# Patient Record
Sex: FEMALE | Race: WHITE | HISPANIC OR LATINO
[De-identification: names, ages, dates, MRNs, and addresses within clinical notes are randomized per-mention and may not be internally consistent; named-entity substitution may affect disease eponyms.]

---

## 2017-02-15 ENCOUNTER — APPOINTMENT (OUTPATIENT)
Dept: OTHER | Facility: CLINIC | Age: 57
End: 2017-02-15

## 2017-10-04 ENCOUNTER — INPATIENT (INPATIENT)
Facility: HOSPITAL | Age: 57
LOS: 3 days | Discharge: HOME | DRG: 812 | End: 2017-10-08
Attending: INTERNAL MEDICINE | Admitting: INTERNAL MEDICINE
Payer: COMMERCIAL

## 2017-10-04 VITALS
TEMPERATURE: 98 F | SYSTOLIC BLOOD PRESSURE: 127 MMHG | OXYGEN SATURATION: 100 % | HEIGHT: 62 IN | RESPIRATION RATE: 16 BRPM | DIASTOLIC BLOOD PRESSURE: 78 MMHG | HEART RATE: 87 BPM | WEIGHT: 134.92 LBS

## 2017-10-04 DIAGNOSIS — Z98.89 OTHER SPECIFIED POSTPROCEDURAL STATES: Chronic | ICD-10-CM

## 2017-10-04 LAB
ANION GAP SERPL CALC-SCNC: 5 MMOL/L — SIGNIFICANT CHANGE UP (ref 5–17)
APPEARANCE UR: CLEAR — SIGNIFICANT CHANGE UP
BASOPHILS # BLD AUTO: 0 K/UL — SIGNIFICANT CHANGE UP (ref 0–0.2)
BASOPHILS NFR BLD AUTO: 0.7 % — SIGNIFICANT CHANGE UP (ref 0–2)
BILIRUB UR-MCNC: NEGATIVE — SIGNIFICANT CHANGE UP
BUN SERPL-MCNC: 17 MG/DL — SIGNIFICANT CHANGE UP (ref 7–18)
CALCIUM SERPL-MCNC: 8.4 MG/DL — SIGNIFICANT CHANGE UP (ref 8.4–10.5)
CHLORIDE SERPL-SCNC: 111 MMOL/L — HIGH (ref 96–108)
CO2 SERPL-SCNC: 26 MMOL/L — SIGNIFICANT CHANGE UP (ref 22–31)
COLOR SPEC: YELLOW — SIGNIFICANT CHANGE UP
CREAT SERPL-MCNC: 0.82 MG/DL — SIGNIFICANT CHANGE UP (ref 0.5–1.3)
DIFF PNL FLD: NEGATIVE — SIGNIFICANT CHANGE UP
EOSINOPHIL # BLD AUTO: 0.3 K/UL — SIGNIFICANT CHANGE UP (ref 0–0.5)
EOSINOPHIL NFR BLD AUTO: 4.8 % — SIGNIFICANT CHANGE UP (ref 0–6)
GLUCOSE SERPL-MCNC: 85 MG/DL — SIGNIFICANT CHANGE UP (ref 70–99)
GLUCOSE UR QL: NEGATIVE — SIGNIFICANT CHANGE UP
HCT VFR BLD CALC: 27.1 % — LOW (ref 34.5–45)
HGB BLD-MCNC: 8.3 G/DL — LOW (ref 11.5–15.5)
KETONES UR-MCNC: NEGATIVE — SIGNIFICANT CHANGE UP
LEUKOCYTE ESTERASE UR-ACNC: ABNORMAL
LYMPHOCYTES # BLD AUTO: 1.8 K/UL — SIGNIFICANT CHANGE UP (ref 1–3.3)
LYMPHOCYTES # BLD AUTO: 31.9 % — SIGNIFICANT CHANGE UP (ref 13–44)
MAGNESIUM SERPL-MCNC: 2.4 MG/DL — SIGNIFICANT CHANGE UP (ref 1.6–2.6)
MCHC RBC-ENTMCNC: 25 PG — LOW (ref 27–34)
MCHC RBC-ENTMCNC: 30.5 GM/DL — LOW (ref 32–36)
MCV RBC AUTO: 82 FL — SIGNIFICANT CHANGE UP (ref 80–100)
MONOCYTES # BLD AUTO: 0.7 K/UL — SIGNIFICANT CHANGE UP (ref 0–0.9)
MONOCYTES NFR BLD AUTO: 12.3 % — SIGNIFICANT CHANGE UP (ref 2–14)
NEUTROPHILS # BLD AUTO: 2.9 K/UL — SIGNIFICANT CHANGE UP (ref 1.8–7.4)
NEUTROPHILS NFR BLD AUTO: 50.3 % — SIGNIFICANT CHANGE UP (ref 43–77)
NITRITE UR-MCNC: NEGATIVE — SIGNIFICANT CHANGE UP
OB PNL STL: NEGATIVE — SIGNIFICANT CHANGE UP
PH UR: 5 — SIGNIFICANT CHANGE UP (ref 5–8)
PHOSPHATE SERPL-MCNC: 3.3 MG/DL — SIGNIFICANT CHANGE UP (ref 2.5–4.5)
PLATELET # BLD AUTO: 195 K/UL — SIGNIFICANT CHANGE UP (ref 150–400)
POTASSIUM SERPL-MCNC: 4.4 MMOL/L — SIGNIFICANT CHANGE UP (ref 3.5–5.3)
POTASSIUM SERPL-SCNC: 4.4 MMOL/L — SIGNIFICANT CHANGE UP (ref 3.5–5.3)
PROT UR-MCNC: NEGATIVE — SIGNIFICANT CHANGE UP
RBC # BLD: 3.3 M/UL — LOW (ref 3.8–5.2)
RBC # FLD: 14.9 % — HIGH (ref 10.3–14.5)
SODIUM SERPL-SCNC: 142 MMOL/L — SIGNIFICANT CHANGE UP (ref 135–145)
SP GR SPEC: 1.01 — SIGNIFICANT CHANGE UP (ref 1.01–1.02)
UROBILINOGEN FLD QL: NEGATIVE — SIGNIFICANT CHANGE UP
WBC # BLD: 5.7 K/UL — SIGNIFICANT CHANGE UP (ref 3.8–10.5)
WBC # FLD AUTO: 5.7 K/UL — SIGNIFICANT CHANGE UP (ref 3.8–10.5)

## 2017-10-04 PROCEDURE — 99285 EMERGENCY DEPT VISIT HI MDM: CPT

## 2017-10-04 PROCEDURE — 70450 CT HEAD/BRAIN W/O DYE: CPT | Mod: 26

## 2017-10-04 PROCEDURE — 71010: CPT | Mod: 26

## 2017-10-04 RX ORDER — SODIUM CHLORIDE 9 MG/ML
1000 INJECTION INTRAMUSCULAR; INTRAVENOUS; SUBCUTANEOUS ONCE
Qty: 0 | Refills: 0 | Status: COMPLETED | OUTPATIENT
Start: 2017-10-04 | End: 2017-10-04

## 2017-10-04 RX ORDER — SODIUM CHLORIDE 9 MG/ML
1000 INJECTION INTRAMUSCULAR; INTRAVENOUS; SUBCUTANEOUS
Qty: 0 | Refills: 0 | Status: DISCONTINUED | OUTPATIENT
Start: 2017-10-04 | End: 2017-10-05

## 2017-10-04 RX ADMIN — SODIUM CHLORIDE 3000 MILLILITER(S): 9 INJECTION INTRAMUSCULAR; INTRAVENOUS; SUBCUTANEOUS at 21:10

## 2017-10-04 RX ADMIN — SODIUM CHLORIDE 150 MILLILITER(S): 9 INJECTION INTRAMUSCULAR; INTRAVENOUS; SUBCUTANEOUS at 22:59

## 2017-10-04 NOTE — ED PROVIDER NOTE - MEDICAL DECISION MAKING DETAILS
Pt with symptomatic anemia. MAR and DR. Hoover endorsed. Pt agrees with admission for hematology consult and serial H/H. I had a detailed discussion with the patient and/or guardian regarding the historical points, exam findings, and any diagnostic results supporting the admit diagnosis.

## 2017-10-04 NOTE — ED PROVIDER NOTE - OBJECTIVE STATEMENT
56 y/o F pt w/ PMHx of Anemia presents to ED c/o weakness and dizziness described as vertiginous since yesterday associated w/ intermittent blurry vision. Pt denies LOC, chest pain, SOB, BRBPR, vaginal bleeding, or any other complaints. Pt reports that she was informed recently by her PMD Dr. Nico De Los Santos that she is anemic. NKDA.

## 2017-10-05 DIAGNOSIS — Z29.9 ENCOUNTER FOR PROPHYLACTIC MEASURES, UNSPECIFIED: ICD-10-CM

## 2017-10-05 DIAGNOSIS — M25.552 PAIN IN LEFT HIP: ICD-10-CM

## 2017-10-05 DIAGNOSIS — D64.9 ANEMIA, UNSPECIFIED: ICD-10-CM

## 2017-10-05 DIAGNOSIS — R10.32 LEFT LOWER QUADRANT PAIN: ICD-10-CM

## 2017-10-05 DIAGNOSIS — D64.89 OTHER SPECIFIED ANEMIAS: ICD-10-CM

## 2017-10-05 LAB
ALBUMIN SERPL ELPH-MCNC: 3.1 G/DL — LOW (ref 3.5–5)
ALBUMIN SERPL ELPH-MCNC: 3.4 G/DL — LOW (ref 3.5–5)
ALP SERPL-CCNC: 70 U/L — SIGNIFICANT CHANGE UP (ref 40–120)
ALP SERPL-CCNC: 76 U/L — SIGNIFICANT CHANGE UP (ref 40–120)
ALT FLD-CCNC: 22 U/L DA — SIGNIFICANT CHANGE UP (ref 10–60)
ALT FLD-CCNC: 24 U/L DA — SIGNIFICANT CHANGE UP (ref 10–60)
ANION GAP SERPL CALC-SCNC: 4 MMOL/L — LOW (ref 5–17)
AST SERPL-CCNC: 23 U/L — SIGNIFICANT CHANGE UP (ref 10–40)
AST SERPL-CCNC: 23 U/L — SIGNIFICANT CHANGE UP (ref 10–40)
B2 MICROGLOB SERPL-MCNC: 2.4 MG/L — HIGH (ref 0.8–2.2)
BASOPHILS # BLD AUTO: 0.1 K/UL — SIGNIFICANT CHANGE UP (ref 0–0.2)
BASOPHILS NFR BLD AUTO: 1.1 % — SIGNIFICANT CHANGE UP (ref 0–2)
BILIRUB DIRECT SERPL-MCNC: 0.1 MG/DL — SIGNIFICANT CHANGE UP (ref 0–0.2)
BILIRUB DIRECT SERPL-MCNC: 0.1 MG/DL — SIGNIFICANT CHANGE UP (ref 0–0.2)
BILIRUB INDIRECT FLD-MCNC: 0.2 MG/DL — SIGNIFICANT CHANGE UP (ref 0.2–1)
BILIRUB INDIRECT FLD-MCNC: 0.3 MG/DL — SIGNIFICANT CHANGE UP (ref 0.2–1)
BILIRUB SERPL-MCNC: 0.3 MG/DL — SIGNIFICANT CHANGE UP (ref 0.2–1.2)
BILIRUB SERPL-MCNC: 0.4 MG/DL — SIGNIFICANT CHANGE UP (ref 0.2–1.2)
BUN SERPL-MCNC: 15 MG/DL — SIGNIFICANT CHANGE UP (ref 7–18)
CALCIUM SERPL-MCNC: 7.8 MG/DL — LOW (ref 8.4–10.5)
CHLORIDE SERPL-SCNC: 114 MMOL/L — HIGH (ref 96–108)
CO2 SERPL-SCNC: 26 MMOL/L — SIGNIFICANT CHANGE UP (ref 22–31)
CREAT SERPL-MCNC: 0.73 MG/DL — SIGNIFICANT CHANGE UP (ref 0.5–1.3)
CRP SERPL-MCNC: 0.2 MG/DL — SIGNIFICANT CHANGE UP (ref 0–0.4)
EOSINOPHIL # BLD AUTO: 0.2 K/UL — SIGNIFICANT CHANGE UP (ref 0–0.5)
EOSINOPHIL NFR BLD AUTO: 4.2 % — SIGNIFICANT CHANGE UP (ref 0–6)
ERYTHROCYTE [SEDIMENTATION RATE] IN BLOOD: 18 MM/HR — SIGNIFICANT CHANGE UP (ref 0–20)
GLUCOSE SERPL-MCNC: 88 MG/DL — SIGNIFICANT CHANGE UP (ref 70–99)
HCT VFR BLD CALC: 27.4 % — LOW (ref 34.5–45)
HGB BLD-MCNC: 8.3 G/DL — LOW (ref 11.5–15.5)
IGA FLD-MCNC: 287 MG/DL — SIGNIFICANT CHANGE UP (ref 68–378)
IGG FLD-MCNC: 918 MG/DL — SIGNIFICANT CHANGE UP (ref 694–1618)
IGM SERPL-MCNC: 74 MG/DL — SIGNIFICANT CHANGE UP (ref 40–230)
IRON SATN MFR SERPL: 25 UG/DL — LOW (ref 40–150)
IRON SATN MFR SERPL: 6 % — LOW (ref 15–50)
KAPPA LC SER QL IFE: 2.19 MG/DL — HIGH (ref 0.33–1.94)
KAPPA LC SER QL IFE: 2.19 MG/DL — HIGH (ref 0.33–1.94)
KAPPA/LAMBDA FREE LIGHT CHAIN RATIO, SERUM: 0.83 RATIO — SIGNIFICANT CHANGE UP (ref 0.26–1.65)
KAPPA/LAMBDA FREE LIGHT CHAIN RATIO, SERUM: 0.83 RATIO — SIGNIFICANT CHANGE UP (ref 0.26–1.65)
LAMBDA LC SER QL IFE: 2.64 MG/DL — HIGH (ref 0.57–2.63)
LAMBDA LC SER QL IFE: 2.64 MG/DL — HIGH (ref 0.57–2.63)
LDH SERPL L TO P-CCNC: 124 U/L — SIGNIFICANT CHANGE UP (ref 120–225)
LYMPHOCYTES # BLD AUTO: 1.7 K/UL — SIGNIFICANT CHANGE UP (ref 1–3.3)
LYMPHOCYTES # BLD AUTO: 30.7 % — SIGNIFICANT CHANGE UP (ref 13–44)
MCHC RBC-ENTMCNC: 25 PG — LOW (ref 27–34)
MCHC RBC-ENTMCNC: 30.3 GM/DL — LOW (ref 32–36)
MCV RBC AUTO: 82.5 FL — SIGNIFICANT CHANGE UP (ref 80–100)
MONOCYTES # BLD AUTO: 0.7 K/UL — SIGNIFICANT CHANGE UP (ref 0–0.9)
MONOCYTES NFR BLD AUTO: 11.6 % — SIGNIFICANT CHANGE UP (ref 2–14)
NEUTROPHILS # BLD AUTO: 3 K/UL — SIGNIFICANT CHANGE UP (ref 1.8–7.4)
NEUTROPHILS NFR BLD AUTO: 52.4 % — SIGNIFICANT CHANGE UP (ref 43–77)
NT-PROBNP SERPL-SCNC: 475 PG/ML — HIGH (ref 0–125)
PLATELET # BLD AUTO: 192 K/UL — SIGNIFICANT CHANGE UP (ref 150–400)
POTASSIUM SERPL-MCNC: 4.4 MMOL/L — SIGNIFICANT CHANGE UP (ref 3.5–5.3)
POTASSIUM SERPL-SCNC: 4.4 MMOL/L — SIGNIFICANT CHANGE UP (ref 3.5–5.3)
PROT SERPL-MCNC: 6.1 G/DL — SIGNIFICANT CHANGE UP (ref 6–8.3)
PROT SERPL-MCNC: 6.7 G/DL — SIGNIFICANT CHANGE UP (ref 6–8.3)
RBC # BLD: 3.32 M/UL — LOW (ref 3.8–5.2)
RBC # BLD: 3.65 M/UL — LOW (ref 3.8–5.2)
RBC # FLD: 15.4 % — HIGH (ref 10.3–14.5)
RETICS #: 40.5 K/UL — SIGNIFICANT CHANGE UP (ref 25–125)
RETICS/RBC NFR: 1.1 % — SIGNIFICANT CHANGE UP (ref 0.5–2.5)
RHEUMATOID FACT SERPL-ACNC: 12 IU/ML — SIGNIFICANT CHANGE UP (ref 0–13.9)
SODIUM SERPL-SCNC: 144 MMOL/L — SIGNIFICANT CHANGE UP (ref 135–145)
TIBC SERPL-MCNC: 436 UG/DL — SIGNIFICANT CHANGE UP (ref 250–450)
TSH SERPL-MCNC: 2.1 UU/ML — SIGNIFICANT CHANGE UP (ref 0.34–4.82)
UIBC SERPL-MCNC: 411 UG/DL — HIGH (ref 110–370)
WBC # BLD: 5.7 K/UL — SIGNIFICANT CHANGE UP (ref 3.8–10.5)
WBC # FLD AUTO: 5.7 K/UL — SIGNIFICANT CHANGE UP (ref 3.8–10.5)

## 2017-10-05 PROCEDURE — 76700 US EXAM ABDOM COMPLETE: CPT | Mod: 26

## 2017-10-05 PROCEDURE — 83020 HEMOGLOBIN ELECTROPHORESIS: CPT | Mod: 26

## 2017-10-05 PROCEDURE — 73502 X-RAY EXAM HIP UNI 2-3 VIEWS: CPT | Mod: 26,LT

## 2017-10-05 RX ORDER — PANTOPRAZOLE SODIUM 20 MG/1
40 TABLET, DELAYED RELEASE ORAL
Qty: 0 | Refills: 0 | Status: DISCONTINUED | OUTPATIENT
Start: 2017-10-05 | End: 2017-10-08

## 2017-10-05 RX ORDER — SODIUM CHLORIDE 9 MG/ML
1000 INJECTION INTRAMUSCULAR; INTRAVENOUS; SUBCUTANEOUS
Qty: 0 | Refills: 0 | Status: DISCONTINUED | OUTPATIENT
Start: 2017-10-05 | End: 2017-10-06

## 2017-10-05 RX ADMIN — SODIUM CHLORIDE 150 MILLILITER(S): 9 INJECTION INTRAMUSCULAR; INTRAVENOUS; SUBCUTANEOUS at 06:19

## 2017-10-05 RX ADMIN — SODIUM CHLORIDE 100 MILLILITER(S): 9 INJECTION INTRAMUSCULAR; INTRAVENOUS; SUBCUTANEOUS at 20:19

## 2017-10-05 NOTE — H&P ADULT - NSHPLABSRESULTS_GEN_ALL_CORE
8.3    5.7   )-----------( 192      ( 05 Oct 2017 05:27 )             27.4   10-05    144  |  114<H>  |  15  ----------------------------<  88  4.4   |  26  |  0.73    Ca    7.8<L>      05 Oct 2017 05:27  Phos  3.3     10-04  Mg     2.4     10-04

## 2017-10-05 NOTE — H&P ADULT - PROBLEM SELECTOR PLAN 1
- FOBT neg, No h/o melena, hematochezia or hematemesis  - f/u iron panel  - f/u retic count  - f/u peripheral smear  - f/u haptoglobin, LDH  - f/u Hb electropheresis  - Hematology consullt  - Hb stable for now. Likely not an acute blood loss.   - Transfuse for Hb< 7 or Hb<8 if patient continues to remain symptomatic

## 2017-10-05 NOTE — CONSULT NOTE ADULT - ASSESSMENT
58 y/o female with chronic anemia not previously evaluated and taking oral iron w/o improvement. She present with symptomatic anemia associated with periorbital swelling / blurry vision / right wrist swelling anorexia w/ 3-5lb weight loss, left sided abdominal and left hip pain.

## 2017-10-05 NOTE — H&P ADULT - NSHPPHYSICALEXAM_GEN_ALL_CORE
Vital Signs Last 24 Hrs  T(C): 37 (05 Oct 2017 05:39), Max: 37.2 (04 Oct 2017 20:14)  T(F): 98.6 (05 Oct 2017 05:39), Max: 99 (04 Oct 2017 20:14)  HR: 58 (05 Oct 2017 05:39) (58 - 88)  BP: 114/56 (05 Oct 2017 05:39) (91/54 - 127/78)  BP(mean): --  RR: 16 (05 Oct 2017 05:39) (16 - 16)  SpO2: 100% (05 Oct 2017 05:39) (100% - 100%)    GENERAL: NAD  HEAD:  Atraumatic, Normocephalic  EYES: EOMI, PERRLA  ENMT:Moist mucous membranes  NECK: Supple  NERVOUS SYSTEM:  Alert & Oriented X3. No vertebral tenderness. Strength 5/5 on all extremities.  CHEST/LUNG: Clear to auscultation bilaterally; No rales, rhonchi, wheezing, or rubs  HEART: Regular rate and rhythm; No murmurs, rubs, or gallops  ABDOMEN: Soft, Nontender, Nondistended; Bowel sounds present  EXTREMITIES:  2+ Peripheral Pulses, No clubbing, cyanosis, or edema.

## 2017-10-05 NOTE — H&P ADULT - ASSESSMENT
56yo F with h/o anemia came in with c/o lightheadedness since the past 3 days. She said she feels very tired all the time. Denies vertigo, falls, LOC, chest pain or SOB. She also c/o weakness since the past couple of days. She said she has severe L hip pain on the lateral side of her L hip going down to the mid thigh since 1 week. It is intermittent 10/10 and stabbing. She said she has intermittent blurry vision since 2-3 days. Denies vaginal bleeding. Denies hematemesis, hematochezia or melena. She said she went through her menopause in 2007. No other complaints at this time.

## 2017-10-05 NOTE — CONSULT NOTE ADULT - PROBLEM SELECTOR RECOMMENDATION 3
Normocytic normochromic - chronic - etiology is unclear at this time but signs and symptoms suggest a plasma cell dyscrasia / amyloid.   Evaluation for anemia sent.   Recommend NT - pro BNP level as well.  Will follow

## 2017-10-05 NOTE — H&P ADULT - PROBLEM SELECTOR PLAN 2
Unsure what the cause is. Denies any fall or trauma. Strength 5/5. No weakness in any extremity. No vertebral tenderness. Full ROM.   - Could be neuropathic pain vs osteonecrosis  - Will do XR of the hip to rule  - f/u TSH, B12

## 2017-10-05 NOTE — CONSULT NOTE ADULT - SUBJECTIVE AND OBJECTIVE BOX
Patient is a 57y old  Female who presents with a chief complaint of Left inguinal pain and dizziness(05 Oct 2017 07:39)      HPI:  58yo F with h/o anemia for several years and told to take oral iron. She was recently referred by her PCP (Dr. Nico De Los Santos) for hematologic evaluation but the MD did not take her insurance. On questioning she notes progressive fatigue associated with periorbital swelling and swelling of the right forearm / wrist / hand.  She c/o new onset left sided abdominal pain. She was referred for u/s by her PCP for Oct 13 but  came in with c/o lightheadedness since the past 3 days. She said she feels very tired all the time. Denies vertigo, falls, LOC, chest pain or SOB. She also c/o weakness since the past couple of days. She said she has severe L hip pain on the lateral side of her L hip going down to the mid thigh since 1 week. It is intermittent 10/10 and stabbing. She said she has intermittent blurry vision since 2-3 days. Denies vaginal bleeding. Denies hematemesis, hematochezia or melena. She said she went through her menopause in . She notes strong smelling dark urine but no hematuria or foaming.  Patient denies family history of anemia or thalassemia. 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 Now on complete disability.  Colonoscopy several years ago      SOCIAL HISTORY:    FAMILY HISTORY:  Mother : Pancreatic / Liver cancer age 70  Maternal Aunt:  stomach cancer age 67  1st cousin : stomach cancer age 56.      MEDICATIONS  (STANDING):  pantoprazole    Tablet 40 milliGRAM(s) Oral before breakfast  sodium chloride 0.9%. 1000 milliLiter(s) (100 mL/Hr) IV Continuous <Continuous>    MEDICATIONS  (PRN):      Allergies    No Known Allergies    Intolerances        Vital Signs Last 24 Hrs  T(C): 36.8 (05 Oct 2017 08:31), Max: 37.2 (04 Oct 2017 20:14)  T(F): 98.2 (05 Oct 2017 08:31), Max: 99 (04 Oct 2017 20:14)  HR: 67 (05 Oct 2017 08:31) (58 - 88)  BP: 98/50 (05 Oct 2017 08:31) (91/54 - 127/78)  BP(mean): --  RR: 18 (05 Oct 2017 08:31) (16 - 18)  SpO2: 97% (05 Oct 2017 08:31) (97% - 100%)    PHYSICAL EXAM  General: adult in NAD  HEENT: clear oropharynx, anicteric sclera, pink conjunctiva  Neck: supple  CV: normal S1/S2 with no murmur rubs or gallops  Lungs: positive air movement b/l ant lungs,clear to auscultation, no wheezes, no rales  Abdomen: soft non-tender non-distended, no hepatosplenomegaly  Ext: no clubbing cyanosis or edema  Skin: no rashes and no petechiae  Neuro: alert and oriented X 4, no focal deficits      LABS:                          8.3    5.7   )-----------( 192      ( 05 Oct 2017 05:27 )             27.4         Mean Cell Volume : 82.5 fl  Mean Cell Hemoglobin : 25.0 pg  Mean Cell Hemoglobin Concentration : 30.3 gm/dL  Auto Neutrophil # : 3.0 K/uL  Auto Lymphocyte # : 1.7 K/uL  Auto Monocyte # : 0.7 K/uL  Auto Eosinophil # : 0.2 K/uL  Auto Basophil # : 0.1 K/uL  Auto Neutrophil % : 52.4 %  Auto Lymphocyte % : 30.7 %  Auto Monocyte % : 11.6 %  Auto Eosinophil % : 4.2 %  Auto Basophil % : 1.1 %      Serial CBC's  10-05 @ 05:27  Hct-27.4 / Hgb-8.3 / Plat-192 / RBC-3.32 / WBC-5.7  Serial CBC's  10-04 @ 21:11  Hct-27.1 / Hgb-8.3 / Plat-195 / RBC-3.30 / WBC-5.7      10-05    144  |  114<H>  |  15  ----------------------------<  88  4.4   |  26  |  0.73    Ca    7.8<L>      05 Oct 2017 05:27  Phos  3.3     10-04  Mg     2.4     10-04                        BLOOD SMEAR INTERPRETATION:       RADIOLOGY & ADDITIONAL STUDIES:

## 2017-10-05 NOTE — H&P ADULT - NSHPREVIEWOFSYSTEMS_GEN_ALL_CORE
REVIEW OF SYSTEMS:  CONSTITUTIONAL: Fatigue, No fever, or weight loss  EYES: blurry vision. No eye pain,  or discharge  RESPIRATORY: No cough, wheezing, chills or hemoptysis; No shortness of breath  CARDIOVASCULAR: No chest pain, palpitations, dizziness, or leg swelling  GASTROINTESTINAL: No abdominal or epigastric pain. No nausea, vomiting, or hematemesis; No diarrhea or constipation. No melena or hematochezia.  GENITOURINARY: No dysuria, frequency, hematuria, or incontinence  NEUROLOGICAL: No headaches, memory loss, loss of strength, or tremors  MUSCULOSKELETAL: Severe L hip pain going down to mid thigh. No muscle, back, or extremity pain

## 2017-10-05 NOTE — H&P ADULT - ATTENDING COMMENTS
58yo F with h/o anemia came in with c/o lightheadedness since the past 3 days. She said she feels very tired all the time. Denies vertigo, falls, LOC, chest pain or SOB. She also c/o weakness since the past couple of days. She said she has severe L hip pain on the lateral side of her L hip going down to the mid thigh since 1 week. It is intermittent 10/10 and stabbing. She said she has intermittent blurry vision since 2-3 days. Denies vaginal bleeding. Denies hematemesis, hematochezia or melena. She said she went through her menopause in 2007. No other complaints at this time. Patient denies family history of anemia or thallesemia.    pt seen in bed, vitals stable except for low bp 98/50, physical exam reveals lungs cta b/l, heart s1s2, abd soft nd nt bs+, ext no edema. labs and diagnostic test result reviewed.    assessment  --  symptomatic anemia, abd pain, left hip pain.    plan  --  admit to med, cont preadmit home meds, gi and dvt profilaxis, transfuse 1 unit prbc  cbc, bmp, mg, phos, lipids, tsh, bld cx, ua, ucx, anemia panel, fecal occult blood test, cea    ct abd pelv  xray left hip/pelv    gi cons  heme onc cons 56yo F with h/o anemia came in with c/o lightheadedness since the past 3 days. She said she feels very tired all the time. Denies vertigo, falls, LOC, chest pain or SOB. She also c/o weakness since the past couple of days. She said she has severe L hip pain on the lateral side of her L hip going down to the mid thigh since 1 week. It is intermittent 10/10 and stabbing. She said she has intermittent blurry vision since 2-3 days. Denies vaginal bleeding. Denies hematemesis, hematochezia or melena. She said she went through her menopause in 2007. No other complaints at this time. Patient denies family history of anemia or thallesemia.    pt seen in bed, vitals stable except for low bp 98/50, physical exam reveals lungs cta b/l, heart s1s2, abd soft nd nt bs+, ext no edema. labs and diagnostic test result reviewed.    assessment  --  symptomatic anemia, abd pain, left hip pain h/o anemia    plan  --  admit to med, cont preadmit home meds, gi and dvt profilaxis, transfuse 1 unit prbc  cbc, bmp, mg, phos, lipids, tsh, bld cx, ua, ucx, anemia panel, fecal occult blood test, cea    ct abd pelv  xray left hip/pelv    gi cons  heme onc cons

## 2017-10-05 NOTE — CHART NOTE - NSCHARTNOTEFT_GEN_A_CORE
Pt remains symptomatic despite Hgb >8.0 this AM  Pt will be transfused 1 unit PRBC per Dr. Frazier.  Pt has signed consent  will f/u CBC and check for clinical improvement of pt.     also sending vasculitic workup for cause of symptomatic anemia  will obtain another FOBT    Melissa RENE

## 2017-10-05 NOTE — H&P ADULT - HISTORY OF PRESENT ILLNESS
58yo F with h/o anemia came in with c/o lightheadedness since the past 3 days. She said she feels very tired all the time. Denies vertigo, falls, LOC, chest pain or SOB. She also c/o weakness since the past couple of days. She said she has severe L hip pain on the lateral side of her L hip going down to the mid thigh since 1 week. It is intermittent 10/10 and stabbing. She said she has intermittent blurry vision since 2-3 days. Denies vaginal bleeding. Denies hematemesis, hematochezia or melena. She said she went through her menopause in 2007. No other complaints at this time. Patient denies family history of anemia or thallesemia.

## 2017-10-06 LAB
% ALBUMIN: 56.4 % — SIGNIFICANT CHANGE UP
% ALPHA 1: 4.4 % — SIGNIFICANT CHANGE UP
% ALPHA 2: 10.6 % — SIGNIFICANT CHANGE UP
% BETA: 13.6 % — SIGNIFICANT CHANGE UP
% GAMMA: 15 % — SIGNIFICANT CHANGE UP
ALBUMIN SERPL ELPH-MCNC: 3.7 G/DL — SIGNIFICANT CHANGE UP (ref 3.6–5.5)
ALBUMIN/GLOB SERPL ELPH: 1.3 RATIO — SIGNIFICANT CHANGE UP
ALPHA1 GLOB SERPL ELPH-MCNC: 0.3 G/DL — SIGNIFICANT CHANGE UP (ref 0.1–0.4)
ALPHA2 GLOB SERPL ELPH-MCNC: 0.7 G/DL — SIGNIFICANT CHANGE UP (ref 0.5–1)
ANA PAT FLD IF-IMP: ABNORMAL
ANA TITR SER: ABNORMAL
B-GLOBULIN SERPL ELPH-MCNC: 0.9 G/DL — SIGNIFICANT CHANGE UP (ref 0.5–1)
FERRITIN SERPL-MCNC: 6 NG/ML — LOW (ref 15–150)
FOLATE SERPL-MCNC: >20 NG/ML — SIGNIFICANT CHANGE UP (ref 4.8–24.2)
GAMMA GLOBULIN: 1 G/DL — SIGNIFICANT CHANGE UP (ref 0.6–1.6)
HCT VFR BLD CALC: 31.1 % — LOW (ref 34.5–45)
HGB BLD-MCNC: 9.9 G/DL — LOW (ref 11.5–15.5)
INTERPRETATION SERPL IFE-IMP: SIGNIFICANT CHANGE UP
INTERPRETATION SERPL IFE-IMP: SIGNIFICANT CHANGE UP
MCHC RBC-ENTMCNC: 26.6 PG — LOW (ref 27–34)
MCHC RBC-ENTMCNC: 31.7 GM/DL — LOW (ref 32–36)
MCV RBC AUTO: 83.8 FL — SIGNIFICANT CHANGE UP (ref 80–100)
PLATELET # BLD AUTO: 179 K/UL — SIGNIFICANT CHANGE UP (ref 150–400)
PROT PATTERN SERPL ELPH-IMP: SIGNIFICANT CHANGE UP
PROT SERPL-MCNC: 6.5 G/DL — SIGNIFICANT CHANGE UP (ref 6–8.3)
PROT SERPL-MCNC: 6.5 G/DL — SIGNIFICANT CHANGE UP (ref 6–8.3)
RBC # BLD: 3.72 M/UL — LOW (ref 3.8–5.2)
RBC # FLD: 15.4 % — HIGH (ref 10.3–14.5)
VIT B12 SERPL-MCNC: >2000 PG/ML — HIGH (ref 243–894)
WBC # BLD: 4.9 K/UL — SIGNIFICANT CHANGE UP (ref 3.8–10.5)
WBC # FLD AUTO: 4.9 K/UL — SIGNIFICANT CHANGE UP (ref 3.8–10.5)

## 2017-10-06 PROCEDURE — 74176 CT ABD & PELVIS W/O CONTRAST: CPT | Mod: 26

## 2017-10-06 RX ORDER — SODIUM CHLORIDE 9 MG/ML
1000 INJECTION INTRAMUSCULAR; INTRAVENOUS; SUBCUTANEOUS
Qty: 0 | Refills: 0 | Status: DISCONTINUED | OUTPATIENT
Start: 2017-10-06 | End: 2017-10-08

## 2017-10-06 RX ORDER — SODIUM CHLORIDE 9 MG/ML
1000 INJECTION INTRAMUSCULAR; INTRAVENOUS; SUBCUTANEOUS ONCE
Qty: 0 | Refills: 0 | Status: COMPLETED | OUTPATIENT
Start: 2017-10-06 | End: 2017-10-06

## 2017-10-06 RX ADMIN — SODIUM CHLORIDE 1000 MILLILITER(S): 9 INJECTION INTRAMUSCULAR; INTRAVENOUS; SUBCUTANEOUS at 00:38

## 2017-10-06 RX ADMIN — PANTOPRAZOLE SODIUM 40 MILLIGRAM(S): 20 TABLET, DELAYED RELEASE ORAL at 06:11

## 2017-10-06 NOTE — PROGRESS NOTE ADULT - PROBLEM SELECTOR PLAN 2
Unsure what the cause is. Denies any fall or trauma. Strength 5/5. No weakness in any extremity. No vertebral tenderness. Full ROM.   - Could be neuropathic pain vs osteonecrosis  - Xray of hip is negative Unsure what the cause is. Denies any fall or trauma. Strength 5/5. No weakness in any extremity. No vertebral tenderness. Full ROM.   - Could be neuropathic pain vs osteonecrosis  - Xray of hip is negative  - may be due to lupus, RANDOLPH greatly positive  f/u lupus panel, C3, C4, JOSIAS  f/u rheumatology consult - Dr. Jaquez will see pt tomorrow

## 2017-10-06 NOTE — PROGRESS NOTE ADULT - PROBLEM SELECTOR PLAN 3
IMPROVE- 0  No dvt ppx needed US abdomen - negative  CT Abd/pelvis - negative  pt afebrile, no episodes of diarrhea, no white count  f/u vasculitic/lupus workup, may be related to abdominal pain

## 2017-10-06 NOTE — PROGRESS NOTE ADULT - PROBLEM SELECTOR PLAN 1
- FOBT neg, No h/o melena, hematochezia or hematemesis  - iron sat low, with low ferritin, normal TIBC, MCV normal, suggestive of classic iron deficiency, possible mixed with B12 deficiency considering gastric bypass history  -haptoglobin and reticulocyte normal  - f/u peripheral smear  - f/u LDH  - f/u Hb electrophoresis  - Hematology consullt  - Hb stable for now. Likely not an acute blood loss.   - Transfused 1 unit yesterday due to persistent symptoms despite Hgb 8.3  repeat CBC this AM shows increase of Hgb to 9.9  B12, folate normal  TSH normal - FOBT neg, No h/o melena, hematochezia or hematemesis  - iron sat low, with low ferritin, normal TIBC, MCV normal, suggestive of classic iron deficiency, possible mixed with B12 deficiency considering gastric bypass history  -haptoglobin and reticulocyte normal; pt states she was put on [id number] supplements around the time she had gastric bypass surgery  - f/u peripheral smear  - f/u LDH  - f/u Hb electrophoresis  - Hematology consullt  - Hb stable for now. Likely not an acute blood loss.   - Transfused 1 unit yesterday due to persistent symptoms despite Hgb 8.3  repeat CBC this AM shows increase of Hgb to 9.9  B12, folate normal  TSH normal - repeat FOBT neg, No h/o melena, hematochezia or hematemesis  - iron sat low, with low ferritin, normal TIBC, MCV normal, suggestive of classic iron deficiency, possible mixed with B12 deficiency considering gastric bypass history  -haptoglobin and reticulocyte normal; pt states she was put on [id number] supplements around the time she had gastric bypass surgery  - f/u peripheral smear  - f/u LDH  - f/u Hb electrophoresis  - f/u Hematology consullt  - Hb stable for now. Likely not an acute blood loss.   - Transfused 1 unit yesterday due to persistent symptoms despite Hgb 8.3  repeat CBC this AM shows increase of Hgb to 9.9  B12, folate normal  TSH normal  US Abdomen CT Abdomen both negative - repeat FOBT neg, No h/o melena, hematochezia or hematemesis  - iron sat low, with low ferritin, normal TIBC, MCV normal, suggestive of classic iron deficiency, possible mixed with B12 deficiency considering gastric bypass history  -haptoglobin and reticulocyte normal; pt states she was put on [id number] supplements around the time she had gastric bypass surgery  - f/u peripheral smear  - f/u LDH  - f/u Hb electrophoresis  - f/u Hematology consullt  - Hb stable for now. Likely not an acute blood loss.   - Transfused 1 unit yesterday due to persistent symptoms despite Hgb 8.3  repeat CBC this AM shows increase of Hgb to 9.9  B12, folate normal  TSH normal

## 2017-10-06 NOTE — PROGRESS NOTE ADULT - SUBJECTIVE AND OBJECTIVE BOX
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 She said she feels very tired all the time. Denies vertigo, falls, LOC, chest pain or SOB. She also c/o weakness since the past couple of days. She said she has severe L hip pain on the lateral side of her L hip going down to the mid thigh since 1 week. It is intermittent 10/10 and stabbing. She said she has intermittent blurry vision since 2-3 days. Denies vaginal bleeding. Denies hematemesis, hematochezia or melena. She said she went through her menopause in . No other complaints at this time. Patient denies family history of anemia or thallesemia. (05 Oct 2017 06:09)      Patient is a 57y old  Female who presents with a chief complaint of Left inguinal pain (05 Oct 2017 07:39)      INTERVAL HPI/OVERNIGHT EVENTS:  Pt has no overnight complaints. Pt states she is feeling much better today, not as lightheaded.     T(C): 36.8 (10-06-17 @ 07:26), Max: 37.7 (10-05-17 @ 17:37)  HR: 60 (10-06-17 @ 07:26) (60 - 80)  BP: 105/53 (10-06-17 @ 07:26) (87/46 - 111/51)  RR: 16 (10-06-17 @ 07:26) (16 - 18)  SpO2: 100% (10-06-17 @ 07:26) (100% - 100%)  Wt(kg): --  I&O's Summary      REVIEW OF SYSTEMS: denies fever, chills, SOB, palpitations, chest pain, abdominal pain, nausea, vomitting, diarrhea, constipation, dizziness    MEDICATIONS  (STANDING):  pantoprazole    Tablet 40 milliGRAM(s) Oral before breakfast  sodium chloride 0.9%. 1000 milliLiter(s) (100 mL/Hr) IV Continuous <Continuous>    MEDICATIONS  (PRN):      PHYSICAL EXAM:  GENERAL: NAD, well-groomed, well-developed  HEAD:  Atraumatic, Normocephalic  EYES: EOMI,  conjunctiva and sclera clear  NECK: Supple, No JVD  NERVOUS SYSTEM:  Alert & Oriented X3, Good concentration  CHEST/LUNG: Clear to auscultation bilaterally; No rales, rhonchi, wheezing, or rubs  HEART: Regular rate and rhythm; No murmurs, rubs, or gallops  ABDOMEN: Soft, Nontender, Nondistended; Bowel sounds present  EXTREMITIES:  2+ Peripheral Pulses, No clubbing, cyanosis, or edema, swelling on b/l fingers/arms; tenderness on palpation of the left lateral thigh  SKIN: No rashes or lesions    LABS:                        9.9    4.9   )-----------( 179      ( 06 Oct 2017 06:14 )             31.1     10-05    144  |  114<H>  |  15  ----------------------------<  88  4.4   |  26  |  0.73    Ca    7.8<L>      05 Oct 2017 05:27  Phos  3.3     10-04  Mg     2.4     10-04    TPro  6.5  /  Alb  x   /  TBili  x   /  DBili  x   /  AST  x   /  ALT  x   /  AlkPhos  x   10-05      Urinalysis Basic - ( 04 Oct 2017 22:50 )    Color: Yellow / Appearance: Clear / S.010 / pH: x  Gluc: x / Ketone: Negative  / Bili: Negative / Urobili: Negative   Blood: x / Protein: Negative / Nitrite: Negative   Leuk Esterase: Trace / RBC: 0-2 /HPF / WBC 3-5 /HPF   Sq Epi: x / Non Sq Epi: x / Bacteria: Trace /HPF      CAPILLARY BLOOD GLUCOSE            Urinalysis Basic - ( 04 Oct 2017 22:50 )    Color: Yellow / Appearance: Clear / S.010 / pH: x  Gluc: x / Ketone: Negative  / Bili: Negative / Urobili: Negative   Blood: x / Protein: Negative / Nitrite: Negative   Leuk Esterase: Trace / RBC: 0-2 /HPF / WBC 3-5 /HPF   Sq Epi: x / Non Sq Epi: x / Bacteria: Trace /HPF HPI:  56yo F with h/o Anemia, Gastric bypass  ([place] [facility]), Bilateral breast augmentation, Right wrist / left should / left knee surgeries s/p fall 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 She said she feels very tired all the time. Denies vertigo, falls, LOC, chest pain or SOB. She also c/o weakness since the past couple of days. She said she has severe L hip pain on the lateral side of her L hip going down to the mid thigh since 1 week. It is intermittent 10/10 and stabbing. She said she has intermittent blurry vision since 2-3 days. Denies vaginal bleeding. Denies hematemesis, hematochezia or melena. She said she went through her menopause in . No other complaints at this time. Patient denies family history of anemia or thallesemia. (05 Oct 2017 06:09)      Patient is a 57y old  Female who presents with a chief complaint of Left inguinal pain (05 Oct 2017 07:39)      INTERVAL HPI/OVERNIGHT EVENTS:  Pt has no overnight complaints. Pt states she is feeling much better today, not as lightheaded. still complains of upper thigh pain and arms swelling.     T(C): 36.8 (10-06-17 @ 07:26), Max: 37.7 (10-05-17 @ 17:37)  HR: 60 (10-06-17 @ 07:26) (60 - 80)  BP: 105/53 (10-06-17 @ 07:26) (87/46 - 111/51)  RR: 16 (10-06-17 @ 07:26) (16 - 18)  SpO2: 100% (10-06-17 @ 07:26) (100% - 100%)  Wt(kg): --  I&O's Summary      REVIEW OF SYSTEMS: denies fever, chills, SOB, palpitations, chest pain, abdominal pain, nausea, vomitting, diarrhea, constipation, dizziness    MEDICATIONS  (STANDING):  pantoprazole    Tablet 40 milliGRAM(s) Oral before breakfast  sodium chloride 0.9%. 1000 milliLiter(s) (100 mL/Hr) IV Continuous <Continuous>    MEDICATIONS  (PRN):      PHYSICAL EXAM:  GENERAL: NAD, well-groomed, well-developed  HEAD:  Atraumatic, Normocephalic  EYES: EOMI,  conjunctiva and sclera clear  NECK: Supple, No JVD  NERVOUS SYSTEM:  Alert & Oriented X3, Good concentration  CHEST/LUNG: Clear to auscultation bilaterally; No rales, rhonchi, wheezing, or rubs  HEART: Regular rate and rhythm; No murmurs, rubs, or gallops  ABDOMEN: Soft, Nontender, Nondistended; Bowel sounds present  EXTREMITIES:  2+ Peripheral Pulses, No clubbing, cyanosis, or edema, swelling on b/l fingers/arms; tenderness on palpation of the left lateral thigh  SKIN: No rashes or lesions    LABS:                        9.9    4.9   )-----------( 179      ( 06 Oct 2017 06:14 )             31.1     10-05    144  |  114<H>  |  15  ----------------------------<  88  4.4   |  26  |  0.73    Ca    7.8<L>      05 Oct 2017 05:27  Phos  3.3     10-04  Mg     2.4     10-04    TPro  6.5  /  Alb  x   /  TBili  x   /  DBili  x   /  AST  x   /  ALT  x   /  AlkPhos  x   10-05      Urinalysis Basic - ( 04 Oct 2017 22:50 )    Color: Yellow / Appearance: Clear / S.010 / pH: x  Gluc: x / Ketone: Negative  / Bili: Negative / Urobili: Negative   Blood: x / Protein: Negative / Nitrite: Negative   Leuk Esterase: Trace / RBC: 0-2 /HPF / WBC 3-5 /HPF   Sq Epi: x / Non Sq Epi: x / Bacteria: Trace /HPF      CAPILLARY BLOOD GLUCOSE            Urinalysis Basic - ( 04 Oct 2017 22:50 )    Color: Yellow / Appearance: Clear / S.010 / pH: x  Gluc: x / Ketone: Negative  / Bili: Negative / Urobili: Negative   Blood: x / Protein: Negative / Nitrite: Negative   Leuk Esterase: Trace / RBC: 0-2 /HPF / WBC 3-5 /HPF   Sq Epi: x / Non Sq Epi: x / Bacteria: Trace /HPF

## 2017-10-06 NOTE — PROGRESS NOTE ADULT - SUBJECTIVE AND OBJECTIVE BOX
Patient is a 57y old  Female who presents with a chief complaint of Left inguinal pain (05 Oct 2017 07:39)    pt seen in icu [  ], reg med floor [   ], bed [  ], chair at bedside [   ], a+o x3 [  ], lethargic [  ],  nad [  ]    alexander [  ], ngt [  ], peg [  ], et tube [  ], cent line [  ], picc line [  ]        Allergies    No Known Allergies        Vitals    T(F): 98.2 (10-06-17 @ 07:26), Max: 99.9 (10-05-17 @ 17:37)  HR: 60 (10-06-17 @ 07:26) (60 - 80)  BP: 105/53 (10-06-17 @ 07:26) (87/46 - 111/51)  RR: 16 (10-06-17 @ 07:26) (16 - 18)  SpO2: 100% (10-06-17 @ 07:26) (100% - 100%)  Wt(kg): --  CAPILLARY BLOOD GLUCOSE          Labs                          9.9    4.9   )-----------( 179      ( 06 Oct 2017 06:14 )             31.1       10-05    144  |  114<H>  |  15  ----------------------------<  88  4.4   |  26  |  0.73    Ca    7.8<L>      05 Oct 2017 05:27  Phos  3.3     10-04  Mg     2.4     10-04    TPro  6.5  /  Alb  x   /  TBili  x   /  DBili  x   /  AST  x   /  ALT  x   /  AlkPhos  x   10-05                Radiology Results      Meds    MEDICATIONS  (STANDING):  pantoprazole    Tablet 40 milliGRAM(s) Oral before breakfast  sodium chloride 0.9%. 1000 milliLiter(s) (100 mL/Hr) IV Continuous <Continuous>      MEDICATIONS  (PRN):      Physical Exam    Neuro :  no focal deficits  Respiratory: CTA B/L  CV: RRR, S1S2, no murmurs,   Abdominal: Soft, NT, ND +BS,  Extremities: No edema, + peripheral pulses, swollen joints of fingers    ASSESSMENT    symptomatic Anemia  abd pain  poly arthritis  iron def  h/o Anemia  H/O abdominoplasty        PLAN    h/h stable s/p transfusion 1 unit prbc  f/u ct abd pelv  gi cons  heme onc f/u  esr neg  stool occult blood neg  rheum cons  cont current meds Patient is a 57y old  Female who presents with a chief complaint of Left inguinal pain (05 Oct 2017 07:39)    pt seen in icu [  ], reg med floor [ x  ], bed [x  ], chair at bedside [   ], a+o x3 [x  ], lethargic [  ],  nad [x  ]        Allergies    No Known Allergies        Vitals    T(F): 98.2 (10-06-17 @ 07:26), Max: 99.9 (10-05-17 @ 17:37)  HR: 60 (10-06-17 @ 07:26) (60 - 80)  BP: 105/53 (10-06-17 @ 07:26) (87/46 - 111/51)  RR: 16 (10-06-17 @ 07:26) (16 - 18)  SpO2: 100% (10-06-17 @ 07:26) (100% - 100%)  Wt(kg): --  CAPILLARY BLOOD GLUCOSE          Labs                          9.9    4.9   )-----------( 179      ( 06 Oct 2017 06:14 )             31.1       10-05    144  |  114<H>  |  15  ----------------------------<  88  4.4   |  26  |  0.73    Ca    7.8<L>      05 Oct 2017 05:27  Phos  3.3     10-04  Mg     2.4     10-04    TPro  6.5  /  Alb  x   /  TBili  x   /  DBili  x   /  AST  x   /  ALT  x   /  AlkPhos  x   10-05      Anti-Nuclear Antibody (10.05.17 @ 17:36)    Anti Nuclear Factor Titer: 1:2560    CHARLY Pattern: Centromere    Rheumatoid Factor Quant, Serum or Plasma (10.05.17 @ 17:36)    Rheumatoid Factor Quant, Serum or Plasma: 12.0 IU/mL    Sedimentation Rate, Erythrocyte (10.05.17 @ 11:13)    Sedimentation Rate, Erythrocyte: 18 mm/Hr    Radiology Results    < from: Xray Hip w/ Pelvis 2 or 3 Views, Left (10.05.17 @ 09:19) >  FINDINGS:  No acute fracture or dislocation.  The left hip joint is maintained.  Visualized sacroiliac joints are preserved.    IMPRESSION:  No radiographic evidence of fracture.    < end of copied text >      < from: US Abdomen Complete (10.05.17 @ 14:28) >  IMPRESSION:     Hepatic cysts    Dilated CBD without ultrasound evidence of choledocholithiasis or   intrahepatic ductal dilatation.    < end of copied text >        Meds    MEDICATIONS  (STANDING):  pantoprazole    Tablet 40 milliGRAM(s) Oral before breakfast  sodium chloride 0.9%. 1000 milliLiter(s) (100 mL/Hr) IV Continuous <Continuous>      MEDICATIONS  (PRN):      Physical Exam    Neuro :  no focal deficits  Respiratory: CTA B/L  CV: RRR, S1S2, no murmurs,   Abdominal: Soft, NT, ND +BS,  Extremities: No edema, + peripheral pulses, swollen joints of fingers    ASSESSMENT    symptomatic Anemia  abd pain  poly arthritis  iron def  h/o Anemia  H/O abdominoplasty        PLAN    h/h stable s/p transfusion 1 unit prbc  f/u ct abd pelv  abd sono with Hepatic cysts Dilated CBD noted above  f/u hepatitis panel  gi cons  heme onc f/u  left hip xray neg for acute patho noted above  esr neg  charly positive  rheum fact neg  stool occult blood neg  rheum cons  ck lupus panel  anti ds-dna  anti monique  complement c3, c4  cont current meds

## 2017-10-07 DIAGNOSIS — R76.8 OTHER SPECIFIED ABNORMAL IMMUNOLOGICAL FINDINGS IN SERUM: ICD-10-CM

## 2017-10-07 LAB
ANTI-RIBONUCLEAR PROTEIN: <0.2 AI — SIGNIFICANT CHANGE UP
C3 SERPL-MCNC: 88 MG/DL — SIGNIFICANT CHANGE UP (ref 80–180)
C4 SERPL-MCNC: 18 MG/DL — SIGNIFICANT CHANGE UP (ref 10–45)
CORTIS AM PEAK SERPL-MCNC: 11.2 UG/DL — SIGNIFICANT CHANGE UP (ref 6–18.4)
DSDNA AB SER-ACNC: <12 IU/ML — SIGNIFICANT CHANGE UP
ENA SM AB FLD QL: <0.2 AI — SIGNIFICANT CHANGE UP

## 2017-10-07 RX ORDER — LORATADINE 10 MG/1
10 TABLET ORAL DAILY
Qty: 0 | Refills: 0 | Status: DISCONTINUED | OUTPATIENT
Start: 2017-10-07 | End: 2017-10-08

## 2017-10-07 RX ORDER — ONDANSETRON 8 MG/1
4 TABLET, FILM COATED ORAL ONCE
Qty: 0 | Refills: 0 | Status: COMPLETED | OUTPATIENT
Start: 2017-10-07 | End: 2017-10-07

## 2017-10-07 RX ADMIN — SODIUM CHLORIDE 70 MILLILITER(S): 9 INJECTION INTRAMUSCULAR; INTRAVENOUS; SUBCUTANEOUS at 18:20

## 2017-10-07 RX ADMIN — Medication 1 DROP(S): at 23:24

## 2017-10-07 RX ADMIN — PANTOPRAZOLE SODIUM 40 MILLIGRAM(S): 20 TABLET, DELAYED RELEASE ORAL at 06:01

## 2017-10-07 RX ADMIN — SODIUM CHLORIDE 70 MILLILITER(S): 9 INJECTION INTRAMUSCULAR; INTRAVENOUS; SUBCUTANEOUS at 00:24

## 2017-10-07 RX ADMIN — Medication 40 MILLIGRAM(S): at 18:20

## 2017-10-07 RX ADMIN — Medication 40 MILLIGRAM(S): at 07:40

## 2017-10-07 RX ADMIN — LORATADINE 10 MILLIGRAM(S): 10 TABLET ORAL at 23:25

## 2017-10-07 RX ADMIN — ONDANSETRON 4 MILLIGRAM(S): 8 TABLET, FILM COATED ORAL at 07:31

## 2017-10-07 NOTE — CONSULT NOTE ADULT - ASSESSMENT
Pt's a 56 y/o female presents with dizzinness x 8 days and left thigh pain. Pt. with increased RANDOLPH 1:2560 remainder of serologies negative for SLE

## 2017-10-07 NOTE — CONSULT NOTE ADULT - SUBJECTIVE AND OBJECTIVE BOX
Patient is a 57y old  Female who presents with a chief complaint of Left inguinal pain (05 Oct 2017 07:39)      HPI:  58yo F with h/o anemia came in with c/o lightheadedness since the past 3 days. She said she feels very tired all the time. Denies vertigo, falls, LOC, chest pain or SOB. She also c/o weakness since the past couple of days. She said she has severe L hip pain on the lateral side of her L hip going down to the mid thigh since 1 week. It is intermittent 10/10 and stabbing. She said she has intermittent blurry vision since 2-3 days. Denies vaginal bleeding. Denies hematemesis, hematochezia or melena. She said she went through her menopause in 2007. No other complaints at this time. Patient denies family history of anemia or thallesemia. (05 Oct 2017 06:09)        PAST MEDICAL & SURGICAL HISTORY:  Anemia  H/O abdominoplasty      MEDICATIONS  (STANDING):  methylPREDNISolone sodium succinate Injectable 40 milliGRAM(s) IV Push two times a day  pantoprazole    Tablet 40 milliGRAM(s) Oral before breakfast  sodium chloride 0.9%. 1000 milliLiter(s) (70 mL/Hr) IV Continuous <Continuous>    MEDICATIONS  (PRN):      Allergies    No Known Allergies    Intolerances                              9.9    4.9   )-----------( 179      ( 06 Oct 2017 06:14 )             31.1           TPro  6.5  /  Alb  3.7  /  TBili  x   /  DBili  x   /  AST  x   /  ALT  x   /  AlkPhos  x   10-05            Vital Signs Last 24 Hrs  T(C): 36.6 (07 Oct 2017 08:05), Max: 36.8 (06 Oct 2017 23:49)  T(F): 97.8 (07 Oct 2017 08:05), Max: 98.2 (06 Oct 2017 23:49)  HR: 58 (07 Oct 2017 08:05) (58 - 61)  BP: 98/48 (07 Oct 2017 08:05) (96/58 - 98/48)  BP(mean): --  RR: 18 (07 Oct 2017 08:05) (18 - 18)  SpO2: 99% (07 Oct 2017 08:05) (99% - 100%)    Physical Exam  Constitutional:    ENMT:    Respiratory: L-clear    Cardiovascular: [id number]    Gastrointestinal: +BS soft nontender    Extremities:-C/C/E    Skin:    Musculoskeletal: no synovitis in the peripheral joints. + tenderness in left trochanteric bursa

## 2017-10-07 NOTE — CONSULT NOTE ADULT - PROBLEM SELECTOR RECOMMENDATION 9
Pt with positive RANDOLPH remaining serologies negative for SLE. Pt. appears to have left trochanteric bursitis. Continue current tx.

## 2017-10-07 NOTE — PROGRESS NOTE ADULT - SUBJECTIVE AND OBJECTIVE BOX
Patient is a 57y old  Female who presents with a chief complaint of Left inguinal pain (05 Oct 2017 07:39)    pt seen in icu [  ], reg med floor [   ], bed [  ], chair at bedside [   ], a+o x3 [  ], lethargic [  ],  nad [  ]    alexander [  ], ngt [  ], peg [  ], et tube [  ], cent line [  ], picc line [  ]        Allergies    No Known Allergies        Vitals    T(F): 98.2 (10-06-17 @ 23:49), Max: 98.2 (10-06-17 @ 23:49)  HR: 61 (10-06-17 @ 23:49) (61 - 63)  BP: 96/58 (10-06-17 @ 23:49) (96/58 - 117/52)  RR: 18 (10-06-17 @ 23:49) (16 - 18)  SpO2: 100% (10-06-17 @ 23:49) (100% - 100%)  Wt(kg): --  CAPILLARY BLOOD GLUCOSE          Labs                          9.9    4.9   )-----------( 179      ( 06 Oct 2017 06:14 )             31.1           TPro  6.5  /  Alb  3.7  /  TBili  x   /  DBili  x   /  AST  x   /  ALT  x   /  AlkPhos  x   10-05                Radiology Results      Meds    MEDICATIONS  (STANDING):  methylPREDNISolone sodium succinate Injectable 40 milliGRAM(s) IV Push two times a day  pantoprazole    Tablet 40 milliGRAM(s) Oral before breakfast  sodium chloride 0.9%. 1000 milliLiter(s) (70 mL/Hr) IV Continuous <Continuous>      MEDICATIONS  (PRN):      Physical Exam    Neuro :  no focal deficits  Respiratory: CTA B/L  CV: RRR, S1S2, no murmurs,   Abdominal: Soft, NT, ND +BS,  Extremities: No edema, + peripheral pulses    ASSESSMENT    Anemia  Anemia  No pertinent past medical history  H/O abdominoplasty  No significant past surgical history      PLAN Patient is a 57y old  Female who presents with a chief complaint of Left inguinal pain (05 Oct 2017 07:39)    pt seen in icu [  ], reg med floor [ x  ], bed [x  ], chair at bedside [   ], a+o x3 [x  ], lethargic [  ],  nad [x  ]    Allergies    No Known Allergies        Vitals    T(F): 98.2 (10-06-17 @ 23:49), Max: 98.2 (10-06-17 @ 23:49)  HR: 61 (10-06-17 @ 23:49) (61 - 63)  BP: 96/58 (10-06-17 @ 23:49) (96/58 - 117/52)  RR: 18 (10-06-17 @ 23:49) (16 - 18)  SpO2: 100% (10-06-17 @ 23:49) (100% - 100%)  Wt(kg): --  CAPILLARY BLOOD GLUCOSE          Labs                          9.9    4.9   )-----------( 179      ( 06 Oct 2017 06:14 )             31.1           TPro  6.5  /  Alb  3.7  /  TBili  x   /  DBili  x   /  AST  x   /  ALT  x   /  AlkPhos  x   10-05        Radiology Results    < from: CT Abdomen and Pelvis w/ Oral Cont (10.06.17 @ 16:05) >  IMPRESSION: Status post gastric bypass surgery. There is no evidence for   bowel obstruction or internal hernia.    Common bile duct dilatation. LFT correlation is recommended. No   choledocholithiasis is seen.    < end of copied text >          Meds    MEDICATIONS  (STANDING):  methylPREDNISolone sodium succinate Injectable 40 milliGRAM(s) IV Push two times a day  pantoprazole    Tablet 40 milliGRAM(s) Oral before breakfast  sodium chloride 0.9%. 1000 milliLiter(s) (70 mL/Hr) IV Continuous <Continuous>      MEDICATIONS  (PRN):      Physical Exam    Neuro :  no focal deficits  Respiratory: CTA B/L  CV: RRR, S1S2, no murmurs,   Abdominal: Soft, NT, ND +BS,  Extremities: No edema, + peripheral pulses, swollen joints of fingers    ASSESSMENT    symptomatic Anemia  abd pain  poly arthritis  iron def  h/o Anemia  H/O abdominoplasty        PLAN    h/h stable s/p transfusion 1 unit prbc  ct abd pelv result noted above  abd sono with Hepatic cysts Dilated CBD noted above  f/u hepatitis panel  gi cons  heme onc f/u  left hip xray neg for acute patho noted above  esr neg  charly positive  rheum fact neg  stool occult blood neg  rheum cons  ck lupus panel  anti ds-dna  anti monique  complement c3, c4  start solumedrol 40mg bid then ridley to prednisone 40 mg daily in am and taper by 10 mg daily  cont current meds

## 2017-10-08 ENCOUNTER — TRANSCRIPTION ENCOUNTER (OUTPATIENT)
Age: 57
End: 2017-10-08

## 2017-10-08 VITALS
RESPIRATION RATE: 17 BRPM | OXYGEN SATURATION: 99 % | DIASTOLIC BLOOD PRESSURE: 54 MMHG | HEART RATE: 77 BPM | SYSTOLIC BLOOD PRESSURE: 113 MMHG | TEMPERATURE: 98 F

## 2017-10-08 PROCEDURE — 82232 ASSAY OF BETA-2 PROTEIN: CPT

## 2017-10-08 PROCEDURE — 81001 URINALYSIS AUTO W/SCOPE: CPT

## 2017-10-08 PROCEDURE — 36430 TRANSFUSION BLD/BLD COMPNT: CPT

## 2017-10-08 PROCEDURE — 86431 RHEUMATOID FACTOR QUANT: CPT

## 2017-10-08 PROCEDURE — 86920 COMPATIBILITY TEST SPIN: CPT

## 2017-10-08 PROCEDURE — 82533 TOTAL CORTISOL: CPT

## 2017-10-08 PROCEDURE — 86038 ANTINUCLEAR ANTIBODIES: CPT

## 2017-10-08 PROCEDURE — 83550 IRON BINDING TEST: CPT

## 2017-10-08 PROCEDURE — 82607 VITAMIN B-12: CPT

## 2017-10-08 PROCEDURE — 85045 AUTOMATED RETICULOCYTE COUNT: CPT

## 2017-10-08 PROCEDURE — 83615 LACTATE (LD) (LDH) ENZYME: CPT

## 2017-10-08 PROCEDURE — 99285 EMERGENCY DEPT VISIT HI MDM: CPT | Mod: 25

## 2017-10-08 PROCEDURE — 83020 HEMOGLOBIN ELECTROPHORESIS: CPT

## 2017-10-08 PROCEDURE — 84100 ASSAY OF PHOSPHORUS: CPT

## 2017-10-08 PROCEDURE — 82272 OCCULT BLD FECES 1-3 TESTS: CPT

## 2017-10-08 PROCEDURE — 86334 IMMUNOFIX E-PHORESIS SERUM: CPT

## 2017-10-08 PROCEDURE — 73502 X-RAY EXAM HIP UNI 2-3 VIEWS: CPT

## 2017-10-08 PROCEDURE — 82746 ASSAY OF FOLIC ACID SERUM: CPT

## 2017-10-08 PROCEDURE — 83521 IG LIGHT CHAINS FREE EACH: CPT

## 2017-10-08 PROCEDURE — 86235 NUCLEAR ANTIGEN ANTIBODY: CPT

## 2017-10-08 PROCEDURE — 86850 RBC ANTIBODY SCREEN: CPT

## 2017-10-08 PROCEDURE — P9040: CPT

## 2017-10-08 PROCEDURE — 86335 IMMUNFIX E-PHORSIS/URINE/CSF: CPT

## 2017-10-08 PROCEDURE — 83880 ASSAY OF NATRIURETIC PEPTIDE: CPT

## 2017-10-08 PROCEDURE — 83735 ASSAY OF MAGNESIUM: CPT

## 2017-10-08 PROCEDURE — 86900 BLOOD TYPING SEROLOGIC ABO: CPT

## 2017-10-08 PROCEDURE — 70450 CT HEAD/BRAIN W/O DYE: CPT

## 2017-10-08 PROCEDURE — 84165 PROTEIN E-PHORESIS SERUM: CPT

## 2017-10-08 PROCEDURE — 86901 BLOOD TYPING SEROLOGIC RH(D): CPT

## 2017-10-08 PROCEDURE — 86140 C-REACTIVE PROTEIN: CPT

## 2017-10-08 PROCEDURE — 76700 US EXAM ABDOM COMPLETE: CPT

## 2017-10-08 PROCEDURE — 80076 HEPATIC FUNCTION PANEL: CPT

## 2017-10-08 PROCEDURE — 82728 ASSAY OF FERRITIN: CPT

## 2017-10-08 PROCEDURE — 84443 ASSAY THYROID STIM HORMONE: CPT

## 2017-10-08 PROCEDURE — 71045 X-RAY EXAM CHEST 1 VIEW: CPT

## 2017-10-08 PROCEDURE — 86225 DNA ANTIBODY NATIVE: CPT

## 2017-10-08 PROCEDURE — 85652 RBC SED RATE AUTOMATED: CPT

## 2017-10-08 PROCEDURE — 84156 ASSAY OF PROTEIN URINE: CPT

## 2017-10-08 PROCEDURE — 80048 BASIC METABOLIC PNL TOTAL CA: CPT

## 2017-10-08 PROCEDURE — 86160 COMPLEMENT ANTIGEN: CPT

## 2017-10-08 PROCEDURE — 93005 ELECTROCARDIOGRAM TRACING: CPT

## 2017-10-08 PROCEDURE — 74176 CT ABD & PELVIS W/O CONTRAST: CPT

## 2017-10-08 PROCEDURE — 85027 COMPLETE CBC AUTOMATED: CPT

## 2017-10-08 RX ORDER — FERROUS SULFATE 325(65) MG
1 TABLET ORAL
Qty: 90 | Refills: 0
Start: 2017-10-08 | End: 2017-11-07

## 2017-10-08 RX ORDER — FERROUS SULFATE 325(65) MG
1 TABLET ORAL
Qty: 90 | Refills: 0 | OUTPATIENT
Start: 2017-10-08 | End: 2017-11-07

## 2017-10-08 RX ADMIN — Medication 40 MILLIGRAM(S): at 12:37

## 2017-10-08 RX ADMIN — Medication 1 DROP(S): at 13:36

## 2017-10-08 RX ADMIN — Medication 40 MILLIGRAM(S): at 06:19

## 2017-10-08 RX ADMIN — LORATADINE 10 MILLIGRAM(S): 10 TABLET ORAL at 13:36

## 2017-10-08 RX ADMIN — Medication 1 DROP(S): at 06:19

## 2017-10-08 RX ADMIN — PANTOPRAZOLE SODIUM 40 MILLIGRAM(S): 20 TABLET, DELAYED RELEASE ORAL at 06:19

## 2017-10-08 NOTE — DISCHARGE NOTE ADULT - CARE PLAN
Principal Discharge DX:	Anemia  Goal:	complete resolution of anemia  Instructions for follow-up, activity and diet:	continue with iron supplements

## 2017-10-08 NOTE — PROGRESS NOTE ADULT - SUBJECTIVE AND OBJECTIVE BOX
Patient is a 57y old  Female who presents with a chief complaint of Left inguinal pain (05 Oct 2017 07:39)    pt seen in icu [  ], reg med floor [ x  ], bed [x  ], chair at bedside [   ], a+o x3 [x  ], lethargic [  ],  nad [x  ]      Allergies    No Known Allergies        Vitals    T(F): 98.5 (10-08-17 @ 07:31), Max: 99 (10-07-17 @ 15:43)  HR: 65 (10-08-17 @ 07:31) (58 - 74)  BP: 103/57 (10-08-17 @ 07:31) (96/50 - 105/50)  RR: 16 (10-08-17 @ 07:31) (16 - 18)  SpO2: 98% (10-08-17 @ 07:31) (98% - 99%)  Wt(kg): --  CAPILLARY BLOOD GLUCOSE          Labs          Radiology Results      Meds    MEDICATIONS  (STANDING):  artificial  tears Solution 1 Drop(s) Both EYES three times a day  loratadine 10 milliGRAM(s) Oral daily  methylPREDNISolone sodium succinate Injectable 40 milliGRAM(s) IV Push two times a day  pantoprazole    Tablet 40 milliGRAM(s) Oral before breakfast  sodium chloride 0.9%. 1000 milliLiter(s) (70 mL/Hr) IV Continuous <Continuous>      MEDICATIONS  (PRN):      Physical Exam    Neuro :  no focal deficits  Respiratory: CTA B/L  CV: RRR, S1S2, no murmurs,   Abdominal: Soft, NT, ND +BS,  Extremities: No edema, + peripheral pulses, swollen joints of fingers    ASSESSMENT    symptomatic Anemia  abd pain  poly arthritis  iron def  h/o Anemia  H/O abdominoplasty        PLAN    h/h stable s/p transfusion 1 unit prbc  ct abd pelv result noted above  abd sono with Hepatic cysts Dilated CBD noted above  f/u hepatitis panel  gi cons  heme onc f/u  left hip xray neg for acute patho noted above  esr neg  charly positive  rheum fact neg  stool occult blood neg  rheum cons  ck lupus panel  anti ds-dna  anti monique  complement c3, c4  d/c solumedrol  start prednisone 40 mg daily in am and taper by 10 mg daily  cont current meds Patient is a 57y old  Female who presents with a chief complaint of Left inguinal pain (05 Oct 2017 07:39)    pt seen in icu [  ], reg med floor [ x  ], bed [x  ], chair at bedside [   ], a+o x3 [x  ], lethargic [  ],  nad [x  ]      Allergies    No Known Allergies        Vitals    T(F): 98.5 (10-08-17 @ 07:31), Max: 99 (10-07-17 @ 15:43)  HR: 65 (10-08-17 @ 07:31) (58 - 74)  BP: 103/57 (10-08-17 @ 07:31) (96/50 - 105/50)  RR: 16 (10-08-17 @ 07:31) (16 - 18)  SpO2: 98% (10-08-17 @ 07:31) (98% - 99%)  Wt(kg): --  CAPILLARY BLOOD GLUCOSE          Labs          Radiology Results      Meds    MEDICATIONS  (STANDING):  artificial  tears Solution 1 Drop(s) Both EYES three times a day  loratadine 10 milliGRAM(s) Oral daily  methylPREDNISolone sodium succinate Injectable 40 milliGRAM(s) IV Push two times a day  pantoprazole    Tablet 40 milliGRAM(s) Oral before breakfast  sodium chloride 0.9%. 1000 milliLiter(s) (70 mL/Hr) IV Continuous <Continuous>      MEDICATIONS  (PRN):      Physical Exam    Neuro :  no focal deficits  Respiratory: CTA B/L  CV: RRR, S1S2, no murmurs,   Abdominal: Soft, NT, ND +BS,  Extremities: No edema, + peripheral pulses, swollen joints of fingers    ASSESSMENT    symptomatic Anemia  abd pain  poly arthritis  iron def  h/o Anemia  H/O abdominoplasty        PLAN    h/h stable s/p transfusion 1 unit prbc  ct abd pelv result noted above  abd sono with Hepatic cysts Dilated CBD noted above  heme onc f/u  left hip xray neg for acute patho noted above  esr neg  charly positive  rheum fact neg  stool occult blood neg  rheum cons  ck lupus panel  anti ds-dna  anti monique  complement c3, c4  d/c solumedrol  start prednisone 40 mg daily in am and taper by 10 mg daily over 8 days  cont current meds  pt stable for d/c

## 2017-10-08 NOTE — DISCHARGE NOTE ADULT - MEDICATION SUMMARY - MEDICATIONS TO STOP TAKING
I will STOP taking the medications listed below when I get home from the hospital:    zolpidem 10 mg oral tablet  -- 1 tab(s) by mouth once a day (at bedtime)    Tylenol with Codeine #3 oral tablet  -- 1 tab(s) by mouth every 6 hours, As Needed for pain

## 2017-10-08 NOTE — PROGRESS NOTE ADULT - SUBJECTIVE AND OBJECTIVE BOX
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 She said she feels very tired all the time. Denies vertigo, falls, LOC, chest pain or SOB. She also c/o weakness since the past couple of days. She said she has severe L hip pain on the lateral side of her L hip going down to the mid thigh since 1 week. It is intermittent 10/10 and stabbing. She said she has intermittent blurry vision since 2-3 days. Denies vaginal bleeding. Denies hematemesis, hematochezia or melena. She said she went through her menopause in . No other complaints at this time. Patient denies family history of anemia or thallesemia. (05 Oct 2017 06:09)      Patient is a 57y old  Female who presents with a chief complaint of Left inguinal pain (05 Oct 2017 07:39)      INTERVAL HPI/OVERNIGHT EVENTS:  Pt has no overnight complaints. Pt states she is feeling much better  today, upper thigh pain has lessened but still present.     Vital Signs Last 24 Hrs  T(C): 36.9 (08 Oct 2017 07:31), Max: 37.2 (07 Oct 2017 15:43)  T(F): 98.5 (08 Oct 2017 07:31), Max: 99 (07 Oct 2017 15:43)  HR: 65 (08 Oct 2017 07:31) (60 - 74)  BP: 103/57 (08 Oct 2017 07:31) (96/50 - 105/50)  BP(mean): --  RR: 16 (08 Oct 2017 07:31) (16 - 17)  SpO2: 98% (08 Oct 2017 07:31) (98% - 99%)      REVIEW OF SYSTEMS: denies fever, chills, SOB, palpitations, chest pain, abdominal pain, nausea, vomitting, diarrhea, constipation, dizziness    MEDICATIONS  (STANDING):  pantoprazole    Tablet 40 milliGRAM(s) Oral before breakfast  sodium chloride 0.9%. 1000 milliLiter(s) (100 mL/Hr) IV Continuous <Continuous>    MEDICATIONS  (PRN):      PHYSICAL EXAM:  GENERAL: NAD, well-groomed, well-developed  HEAD:  Atraumatic, Normocephalic  EYES: EOMI,  conjunctiva and sclera clear  NECK: Supple, No JVD  NERVOUS SYSTEM:  Alert & Oriented X3, Good concentration  CHEST/LUNG: Clear to auscultation bilaterally; No rales, rhonchi, wheezing, or rubs  HEART: Regular rate and rhythm; No murmurs, rubs, or gallops  ABDOMEN: Soft, Nontender, Nondistended; Bowel sounds present  EXTREMITIES:  2+ Peripheral Pulses, No clubbing, cyanosis, or edema, swelling on b/l fingers/arms; tenderness on palpation of the left lateral thigh  SKIN: No rashes or lesions    LABS:                        9.9    4.9   )-----------( 179      ( 06 Oct 2017 06:14 )             31.1     10-05    144  |  114<H>  |  15  ----------------------------<  88  4.4   |  26  |  0.73    Ca    7.8<L>      05 Oct 2017 05:27  Phos  3.3     10-04  Mg     2.4     10-04    TPro  6.5  /  Alb  x   /  TBili  x   /  DBili  x   /  AST  x   /  ALT  x   /  AlkPhos  x   10-05      Urinalysis Basic - ( 04 Oct 2017 22:50 )    Color: Yellow / Appearance: Clear / S.010 / pH: x  Gluc: x / Ketone: Negative  / Bili: Negative / Urobili: Negative   Blood: x / Protein: Negative / Nitrite: Negative   Leuk Esterase: Trace / RBC: 0-2 /HPF / WBC 3-5 /HPF   Sq Epi: x / Non Sq Epi: x / Bacteria: Trace /HPF      CAPILLARY BLOOD GLUCOSE            Urinalysis Basic - ( 04 Oct 2017 22:50 )    Color: Yellow / Appearance: Clear / S.010 / pH: x  Gluc: x / Ketone: Negative  / Bili: Negative / Urobili: Negative   Blood: x / Protein: Negative / Nitrite: Negative   Leuk Esterase: Trace / RBC: 0-2 /HPF / WBC 3-5 /HPF   Sq Epi: x / Non Sq Epi: x / Bacteria: Trace /HPF

## 2017-10-08 NOTE — PROGRESS NOTE ADULT - PROBLEM SELECTOR PLAN 2
Unsure what the cause is. Denies any fall or trauma. Strength 5/5. No weakness in any extremity. No vertebral tenderness. Full ROM.   - Could be neuropathic pain vs osteonecrosis  - Xray of hip is negative  - may be due to lupus, RANDOLPH greatly positive  f/u lupus panel  C3, C4 JOSIAS negative  f/u rheumatology consult - Dr. Jaquez   pt started on 40mg prednisone PO  will monitor for improvement

## 2017-10-08 NOTE — DISCHARGE NOTE ADULT - HOSPITAL COURSE
Covering resident note-Called by attending to discharge patient     58yo F with h/o anemia came in with c/o lightheadedness and weakness since the past couple of days. Also complaining of   severe L hip pain on the lateral side of her L hip going down to the mid thigh since 1 week. Admitted for anemia and left hip pain     Anemia.    -Iron deficiency anemia   - FOBT neg, No h/o melena, hematochezia or hematemesis  - iron sat low, with low ferritin, normal TIBC, MCV normal, suggestive of classic iron deficiency  -haptoglobin and reticulocyte normal;   - Transfused 1 unit 2 days ago due to persistent symptoms with increase of Hgb to 9.9  B12, folate normal  TSH normal.      Left hip pain.  -Likely trochanteric bursitis   - Xray of hip is negative  - tylenol prn for pain and heating pad as needed  - rheumatology consult - Dr. Jaquez   -Pt. with increased RANDOLPH 1:2560 remainder of serologies negative for SLE  -pt started on solumedrol inpatient for polyarthritis improving on steroids  . As per attending to d/c on prednisone Taper       Left lower quadrant pain.   US abdomen - negative  CT Abd/pelvis - Common bile duct dilatation.   Outpatient gastroenterology follow up needed     Patient stable for discharge per attending . Patient & family member educated about importance of medication compliance & physician follow up.All concerns & questions have been appropriately adressed.

## 2017-10-08 NOTE — PROGRESS NOTE ADULT - PROBLEM SELECTOR PLAN 1
- repeat FOBT neg, No h/o melena, hematochezia or hematemesis  - iron sat low, with low ferritin, normal TIBC, MCV normal, suggestive of classic iron deficiency, possible mixed with B12 deficiency considering gastric bypass history  -haptoglobin and reticulocyte normal; pt states she was put on [id number] supplements around the time she had gastric bypass surgery  LDH, peripheral smearl, Hgb electrophoresis insignificant  - Hb stable for now. Likely not an acute blood loss.   - Transfused 1 unit 2 days ago due to persistent symptoms with increase of Hgb to 9.9  B12, folate normal  TSH normal

## 2017-10-08 NOTE — DISCHARGE NOTE ADULT - PATIENT PORTAL LINK FT
“You can access the [employer] Patient Portal, offered by [facility], by registering with the following website: [email or web address]”

## 2017-10-08 NOTE — DISCHARGE NOTE ADULT - MEDICATION SUMMARY - MEDICATIONS TO TAKE
I will START or STAY ON the medications listed below when I get home from the hospital:    predniSONE 20 mg oral tablet  -- 2 tab(s) by mouth once a day for 3 days  1.5 tab by mouth once a day for 3 days  1 tab by mouth once a day for 3 days  -- It is very important that you take or use this exactly as directed.  Do not skip doses or discontinue unless directed by your doctor.  Obtain medical advice before taking any non-prescription drugs as some may affect the action of this medication.  Take with food or milk.    -- Indication: For polyarthritis     FeroSul 325 mg (65 mg elemental iron) oral tablet  -- 1 tab(s) by mouth 3 times a day   -- Check with your doctor before becoming pregnant.  Do not chew, break, or crush.  May discolor urine or feces.    -- Indication: For Anemia I will START or STAY ON the medications listed below when I get home from the hospital:    predniSONE 20 mg oral tablet  -- 2 tab(s) by mouth once a day for 3 days  1.5 tab by mouth once a day for 3 days  1 tab by mouth once a day for 3 days  -- It is very important that you take or use this exactly as directed.  Do not skip doses or discontinue unless directed by your doctor.  Obtain medical advice before taking any non-prescription drugs as some may affect the action of this medication.  Take with food or milk.    -- Indication: For polyarthritis    FeroSul 325 mg (65 mg elemental iron) oral tablet  -- 1 tab(s) by mouth 3 times a day   -- Check with your doctor before becoming pregnant.  Do not chew, break, or crush.  May discolor urine or feces.    -- Indication: For iron deficiency

## 2017-10-09 LAB
DRVVT SCREEN TO CONFIRM RATIO: SIGNIFICANT CHANGE UP
HEMOGLOBIN INTERPRETATION: SIGNIFICANT CHANGE UP
HGB A MFR BLD: 97.8 % — SIGNIFICANT CHANGE UP (ref 95.8–98)
HGB A2 MFR BLD: 2.2 % — SIGNIFICANT CHANGE UP (ref 2–3.2)
INTERPRETATION 24H UR IFE-IMP: SIGNIFICANT CHANGE UP
LA NT DPL PPP QL: 26.6 SEC — SIGNIFICANT CHANGE UP
NORMALIZED SCT PPP-RTO: 0.92 RATIO — SIGNIFICANT CHANGE UP (ref 0–1.16)
NORMALIZED SCT PPP-RTO: SIGNIFICANT CHANGE UP

## 2017-10-12 DIAGNOSIS — D50.9 IRON DEFICIENCY ANEMIA, UNSPECIFIED: ICD-10-CM

## 2017-10-12 DIAGNOSIS — R76.8 OTHER SPECIFIED ABNORMAL IMMUNOLOGICAL FINDINGS IN SERUM: ICD-10-CM

## 2017-10-12 DIAGNOSIS — M70.72 OTHER BURSITIS OF HIP, LEFT HIP: ICD-10-CM

## 2017-10-12 DIAGNOSIS — E53.8 DEFICIENCY OF OTHER SPECIFIED B GROUP VITAMINS: ICD-10-CM

## 2017-10-13 PROBLEM — D64.9 ANEMIA, UNSPECIFIED: Chronic | Status: ACTIVE | Noted: 2017-10-04

## 2017-11-01 ENCOUNTER — APPOINTMENT (OUTPATIENT)
Dept: OTHER | Facility: CLINIC | Age: 57
End: 2017-11-01
Payer: COMMERCIAL

## 2017-11-01 ENCOUNTER — LABORATORY RESULT (OUTPATIENT)
Age: 57
End: 2017-11-01

## 2017-11-01 VITALS
BODY MASS INDEX: 26.13 KG/M2 | RESPIRATION RATE: 14 BRPM | WEIGHT: 142 LBS | OXYGEN SATURATION: 99 % | HEIGHT: 62 IN | DIASTOLIC BLOOD PRESSURE: 62 MMHG | SYSTOLIC BLOOD PRESSURE: 97 MMHG | HEART RATE: 64 BPM

## 2017-11-01 PROCEDURE — 96150: CPT

## 2017-11-01 PROCEDURE — 99396 PREV VISIT EST AGE 40-64: CPT

## 2017-11-01 PROCEDURE — 99214 OFFICE O/P EST MOD 30 MIN: CPT | Mod: 25

## 2017-11-01 PROCEDURE — 94010 BREATHING CAPACITY TEST: CPT

## 2017-11-02 LAB
ALBUMIN SERPL ELPH-MCNC: 4 G/DL
ALP BLD-CCNC: 80 U/L
ALT SERPL-CCNC: 21 U/L
ANION GAP SERPL CALC-SCNC: 18 MMOL/L
APPEARANCE: CLEAR
AST SERPL-CCNC: 31 U/L
BASOPHILS # BLD AUTO: 0.06 K/UL
BASOPHILS NFR BLD AUTO: 0.9 %
BILIRUB SERPL-MCNC: 0.2 MG/DL
BILIRUBIN URINE: NEGATIVE
BLOOD URINE: NEGATIVE
BUN SERPL-MCNC: 21 MG/DL
CALCIUM SERPL-MCNC: 9.8 MG/DL
CHLORIDE SERPL-SCNC: 107 MMOL/L
CHOLEST SERPL-MCNC: 190 MG/DL
CHOLEST/HDLC SERPL: 2.5 RATIO
CO2 SERPL-SCNC: 20 MMOL/L
COLOR: YELLOW
CREAT SERPL-MCNC: 0.69 MG/DL
EOSINOPHIL # BLD AUTO: 0.76 K/UL
EOSINOPHIL NFR BLD AUTO: 11.5 %
GLUCOSE QUALITATIVE U: NEGATIVE MG/DL
GLUCOSE SERPL-MCNC: 90 MG/DL
HCT VFR BLD CALC: 32.6 %
HDLC SERPL-MCNC: 75 MG/DL
HGB BLD-MCNC: 10 G/DL
KETONES URINE: NEGATIVE
LDLC SERPL CALC-MCNC: 90 MG/DL
LEUKOCYTE ESTERASE URINE: NEGATIVE
LYMPHOCYTES # BLD AUTO: 2.22 K/UL
LYMPHOCYTES NFR BLD AUTO: 33.6 %
MAN DIFF?: NORMAL
MCHC RBC-ENTMCNC: 26.4 PG
MCHC RBC-ENTMCNC: 30.7 GM/DL
MCV RBC AUTO: 86 FL
MONOCYTES # BLD AUTO: 0.47 K/UL
MONOCYTES NFR BLD AUTO: 7.1 %
NEUTROPHILS # BLD AUTO: 3.1 K/UL
NEUTROPHILS NFR BLD AUTO: 46.9 %
NITRITE URINE: NEGATIVE
PH URINE: 5
PLATELET # BLD AUTO: 247 K/UL
POTASSIUM SERPL-SCNC: 5 MMOL/L
PROT SERPL-MCNC: 6.6 G/DL
PROTEIN URINE: NEGATIVE MG/DL
RBC # BLD: 3.79 M/UL
RBC # FLD: 21.7 %
SODIUM SERPL-SCNC: 145 MMOL/L
SPECIFIC GRAVITY URINE: 1.02
TRIGL SERPL-MCNC: 125 MG/DL
UROBILINOGEN URINE: NEGATIVE MG/DL
WBC # FLD AUTO: 6.6 K/UL

## 2018-02-07 ENCOUNTER — APPOINTMENT (OUTPATIENT)
Dept: OTHER | Facility: CLINIC | Age: 58
End: 2018-02-07
Payer: COMMERCIAL

## 2018-02-07 VITALS
RESPIRATION RATE: 16 BRPM | SYSTOLIC BLOOD PRESSURE: 108 MMHG | DIASTOLIC BLOOD PRESSURE: 66 MMHG | HEIGHT: 62 IN | HEART RATE: 78 BPM | WEIGHT: 139 LBS | OXYGEN SATURATION: 98 % | BODY MASS INDEX: 25.58 KG/M2

## 2018-02-07 PROCEDURE — 99214 OFFICE O/P EST MOD 30 MIN: CPT

## 2018-03-20 ENCOUNTER — RX RENEWAL (OUTPATIENT)
Age: 58
End: 2018-03-20

## 2018-03-24 ENCOUNTER — INPATIENT (INPATIENT)
Facility: HOSPITAL | Age: 58
LOS: 2 days | Discharge: HOME | DRG: 880 | End: 2018-03-27
Attending: INTERNAL MEDICINE | Admitting: INTERNAL MEDICINE
Payer: MEDICARE

## 2018-03-24 VITALS
HEIGHT: 62 IN | HEART RATE: 67 BPM | RESPIRATION RATE: 16 BRPM | TEMPERATURE: 98 F | WEIGHT: 139.99 LBS | DIASTOLIC BLOOD PRESSURE: 70 MMHG | SYSTOLIC BLOOD PRESSURE: 112 MMHG | OXYGEN SATURATION: 99 %

## 2018-03-24 DIAGNOSIS — Z98.89 OTHER SPECIFIED POSTPROCEDURAL STATES: Chronic | ICD-10-CM

## 2018-03-24 LAB
ALBUMIN SERPL ELPH-MCNC: 3.6 G/DL — SIGNIFICANT CHANGE UP (ref 3.5–5)
ALP SERPL-CCNC: 78 U/L — SIGNIFICANT CHANGE UP (ref 40–120)
ALT FLD-CCNC: 20 U/L DA — SIGNIFICANT CHANGE UP (ref 10–60)
ANION GAP SERPL CALC-SCNC: 8 MMOL/L — SIGNIFICANT CHANGE UP (ref 5–17)
AST SERPL-CCNC: 33 U/L — SIGNIFICANT CHANGE UP (ref 10–40)
BASOPHILS # BLD AUTO: 0.1 K/UL — SIGNIFICANT CHANGE UP (ref 0–0.2)
BASOPHILS NFR BLD AUTO: 0.9 % — SIGNIFICANT CHANGE UP (ref 0–2)
BILIRUB SERPL-MCNC: 0.3 MG/DL — SIGNIFICANT CHANGE UP (ref 0.2–1.2)
BUN SERPL-MCNC: 14 MG/DL — SIGNIFICANT CHANGE UP (ref 7–18)
CALCIUM SERPL-MCNC: 8.5 MG/DL — SIGNIFICANT CHANGE UP (ref 8.4–10.5)
CHLORIDE SERPL-SCNC: 107 MMOL/L — SIGNIFICANT CHANGE UP (ref 96–108)
CO2 SERPL-SCNC: 26 MMOL/L — SIGNIFICANT CHANGE UP (ref 22–31)
CREAT SERPL-MCNC: 0.74 MG/DL — SIGNIFICANT CHANGE UP (ref 0.5–1.3)
EOSINOPHIL # BLD AUTO: 0.3 K/UL — SIGNIFICANT CHANGE UP (ref 0–0.5)
EOSINOPHIL NFR BLD AUTO: 5.2 % — SIGNIFICANT CHANGE UP (ref 0–6)
GLUCOSE SERPL-MCNC: 80 MG/DL — SIGNIFICANT CHANGE UP (ref 70–99)
HCT VFR BLD CALC: 31.6 % — LOW (ref 34.5–45)
HGB BLD-MCNC: 10.3 G/DL — LOW (ref 11.5–15.5)
LYMPHOCYTES # BLD AUTO: 2 K/UL — SIGNIFICANT CHANGE UP (ref 1–3.3)
LYMPHOCYTES # BLD AUTO: 35.7 % — SIGNIFICANT CHANGE UP (ref 13–44)
MCHC RBC-ENTMCNC: 30.3 PG — SIGNIFICANT CHANGE UP (ref 27–34)
MCHC RBC-ENTMCNC: 32.7 GM/DL — SIGNIFICANT CHANGE UP (ref 32–36)
MCV RBC AUTO: 92.8 FL — SIGNIFICANT CHANGE UP (ref 80–100)
MONOCYTES # BLD AUTO: 0.6 K/UL — SIGNIFICANT CHANGE UP (ref 0–0.9)
MONOCYTES NFR BLD AUTO: 11.5 % — SIGNIFICANT CHANGE UP (ref 2–14)
NEUTROPHILS # BLD AUTO: 2.6 K/UL — SIGNIFICANT CHANGE UP (ref 1.8–7.4)
NEUTROPHILS NFR BLD AUTO: 46.7 % — SIGNIFICANT CHANGE UP (ref 43–77)
PLATELET # BLD AUTO: 207 K/UL — SIGNIFICANT CHANGE UP (ref 150–400)
POTASSIUM SERPL-MCNC: 3.9 MMOL/L — SIGNIFICANT CHANGE UP (ref 3.5–5.3)
POTASSIUM SERPL-SCNC: 3.9 MMOL/L — SIGNIFICANT CHANGE UP (ref 3.5–5.3)
PROT SERPL-MCNC: 7 G/DL — SIGNIFICANT CHANGE UP (ref 6–8.3)
RBC # BLD: 3.4 M/UL — LOW (ref 3.8–5.2)
RBC # FLD: 14.9 % — HIGH (ref 10.3–14.5)
SODIUM SERPL-SCNC: 141 MMOL/L — SIGNIFICANT CHANGE UP (ref 135–145)
TROPONIN I SERPL-MCNC: <0.015 NG/ML — SIGNIFICANT CHANGE UP (ref 0–0.04)
WBC # BLD: 5.7 K/UL — SIGNIFICANT CHANGE UP (ref 3.8–10.5)
WBC # FLD AUTO: 5.7 K/UL — SIGNIFICANT CHANGE UP (ref 3.8–10.5)

## 2018-03-24 PROCEDURE — 70450 CT HEAD/BRAIN W/O DYE: CPT | Mod: 26

## 2018-03-24 PROCEDURE — 71046 X-RAY EXAM CHEST 2 VIEWS: CPT | Mod: 26

## 2018-03-24 RX ORDER — SODIUM CHLORIDE 9 MG/ML
3 INJECTION INTRAMUSCULAR; INTRAVENOUS; SUBCUTANEOUS ONCE
Qty: 0 | Refills: 0 | Status: COMPLETED | OUTPATIENT
Start: 2018-03-24 | End: 2018-03-24

## 2018-03-24 RX ORDER — ACETAMINOPHEN 500 MG
650 TABLET ORAL ONCE
Qty: 0 | Refills: 0 | Status: COMPLETED | OUTPATIENT
Start: 2018-03-24 | End: 2018-03-24

## 2018-03-24 RX ADMIN — SODIUM CHLORIDE 3 MILLILITER(S): 9 INJECTION INTRAMUSCULAR; INTRAVENOUS; SUBCUTANEOUS at 22:54

## 2018-03-24 NOTE — ED PROVIDER NOTE - PROGRESS NOTE DETAILS
Picture of R middle finger during one of the episodes shown. White color concerning for Raynaud's Phenomenon. Patient is resting comfortably, NAD. Patient resting comfortably, feels mildly improved.

## 2018-03-24 NOTE — ED PROVIDER NOTE - MEDICAL DECISION MAKING DETAILS
58 y/o female with CP/HA. Will get EKG, labs and admit. Pt also with Raynaud's Phenomenon. Inpatient team can begin rheumatologic workup.

## 2018-03-24 NOTE — ED PROVIDER NOTE - OBJECTIVE STATEMENT
56 y/o female with no significant PMHx presents to the ED c/o pain/numbness to R arm x 3 days. Pt notes Sx were initially intermittent but has been constant since 12PM today. pt notes numbness in her R arm runs from the upper arm to the middle finger, other fingers are spared. Pt notes several episodes where her middle finger turns white and becomes painful. Pt also notes episodes of palpitations (heart racing) that last a few seconds. Pt also notes around 12PM, gradual onset of occipital HA with mild CP and dyspnea on exertion. Pt denies fever, nausea, cough, diaphoresis, face/leg numbness, or any other complaints. NKDA 58 y/o female with no significant PMHx presents to the ED c/o pain/numbness to R arm x 3 days. Pt notes Sx were initially intermittent but has been constant since 12PM today. pt notes numbness in her R arm runs from the upper arm to the middle finger, other fingers are spared. Pt notes several episodes where her middle finger turns white and becomes painful. Pt also notes episodes of palpitations (heart racing) that last a few seconds. Pt also notes around 12PM, gradual onset of occipital HA with mild CP and dyspnea on exertion. Pt denies fever, nausea, cough, diaphoresis, face/leg numbness, or any other complaints. NKDA. [employer] [id number] used.

## 2018-03-24 NOTE — ED PROVIDER NOTE - CARE PLAN
Principal Discharge DX:	Chest pain, unspecified type  Secondary Diagnosis:	Acute nonintractable headache, unspecified headache type  Secondary Diagnosis:	Palpitations

## 2018-03-25 DIAGNOSIS — R07.9 CHEST PAIN, UNSPECIFIED: ICD-10-CM

## 2018-03-25 DIAGNOSIS — D64.9 ANEMIA, UNSPECIFIED: ICD-10-CM

## 2018-03-25 DIAGNOSIS — Z29.9 ENCOUNTER FOR PROPHYLACTIC MEASURES, UNSPECIFIED: ICD-10-CM

## 2018-03-25 LAB
ANION GAP SERPL CALC-SCNC: 10 MMOL/L — SIGNIFICANT CHANGE UP (ref 5–17)
BUN SERPL-MCNC: 14 MG/DL — SIGNIFICANT CHANGE UP (ref 7–18)
CALCIUM SERPL-MCNC: 8.3 MG/DL — LOW (ref 8.4–10.5)
CHLORIDE SERPL-SCNC: 110 MMOL/L — HIGH (ref 96–108)
CHOLEST SERPL-MCNC: 143 MG/DL — SIGNIFICANT CHANGE UP (ref 10–199)
CK MB BLD-MCNC: 1.4 % — SIGNIFICANT CHANGE UP (ref 0–3.5)
CK MB CFR SERPL CALC: 1.2 NG/ML — SIGNIFICANT CHANGE UP (ref 0–3.6)
CK SERPL-CCNC: 85 U/L — SIGNIFICANT CHANGE UP (ref 21–215)
CO2 SERPL-SCNC: 25 MMOL/L — SIGNIFICANT CHANGE UP (ref 22–31)
CREAT SERPL-MCNC: 0.64 MG/DL — SIGNIFICANT CHANGE UP (ref 0.5–1.3)
CRP SERPL-MCNC: <0.2 MG/DL — SIGNIFICANT CHANGE UP (ref 0–0.4)
ERYTHROCYTE [SEDIMENTATION RATE] IN BLOOD: 11 MM/HR — SIGNIFICANT CHANGE UP (ref 0–20)
FERRITIN SERPL-MCNC: 226 NG/ML — HIGH (ref 15–150)
FOLATE SERPL-MCNC: 16.5 NG/ML — SIGNIFICANT CHANGE UP (ref 4.8–24.2)
GLUCOSE SERPL-MCNC: 85 MG/DL — SIGNIFICANT CHANGE UP (ref 70–99)
HBA1C BLD-MCNC: 5.2 % — SIGNIFICANT CHANGE UP (ref 4–5.6)
HCT VFR BLD CALC: 32.6 % — LOW (ref 34.5–45)
HDLC SERPL-MCNC: 79 MG/DL — SIGNIFICANT CHANGE UP (ref 40–125)
HGB BLD-MCNC: 9.9 G/DL — LOW (ref 11.5–15.5)
IRON SATN MFR SERPL: 21 % — SIGNIFICANT CHANGE UP (ref 15–50)
IRON SATN MFR SERPL: 83 UG/DL — SIGNIFICANT CHANGE UP (ref 40–150)
LIPID PNL WITH DIRECT LDL SERPL: 51 MG/DL — SIGNIFICANT CHANGE UP
MAGNESIUM SERPL-MCNC: 2.4 MG/DL — SIGNIFICANT CHANGE UP (ref 1.6–2.6)
MCHC RBC-ENTMCNC: 28.9 PG — SIGNIFICANT CHANGE UP (ref 27–34)
MCHC RBC-ENTMCNC: 30.3 GM/DL — LOW (ref 32–36)
MCV RBC AUTO: 95.4 FL — SIGNIFICANT CHANGE UP (ref 80–100)
PHOSPHATE SERPL-MCNC: 3.8 MG/DL — SIGNIFICANT CHANGE UP (ref 2.5–4.5)
PLATELET # BLD AUTO: 204 K/UL — SIGNIFICANT CHANGE UP (ref 150–400)
POTASSIUM SERPL-MCNC: 3.6 MMOL/L — SIGNIFICANT CHANGE UP (ref 3.5–5.3)
POTASSIUM SERPL-SCNC: 3.6 MMOL/L — SIGNIFICANT CHANGE UP (ref 3.5–5.3)
RBC # BLD: 3.42 M/UL — LOW (ref 3.8–5.2)
RBC # FLD: 15.1 % — HIGH (ref 10.3–14.5)
RHEUMATOID FACT SERPL-ACNC: <7 IU/ML — SIGNIFICANT CHANGE UP (ref 0–13)
SODIUM SERPL-SCNC: 145 MMOL/L — SIGNIFICANT CHANGE UP (ref 135–145)
TIBC SERPL-MCNC: 394 UG/DL — SIGNIFICANT CHANGE UP (ref 250–450)
TOTAL CHOLESTEROL/HDL RATIO MEASUREMENT: 1.8 RATIO — LOW (ref 3.3–7.1)
TRIGL SERPL-MCNC: 65 MG/DL — SIGNIFICANT CHANGE UP (ref 10–149)
TROPONIN I SERPL-MCNC: <0.015 NG/ML — SIGNIFICANT CHANGE UP (ref 0–0.04)
TROPONIN I SERPL-MCNC: <0.015 NG/ML — SIGNIFICANT CHANGE UP (ref 0–0.04)
TSH SERPL-MCNC: 2.49 UU/ML — SIGNIFICANT CHANGE UP (ref 0.34–4.82)
UIBC SERPL-MCNC: 311 UG/DL — SIGNIFICANT CHANGE UP (ref 110–370)
VIT B12 SERPL-MCNC: 532 PG/ML — SIGNIFICANT CHANGE UP (ref 232–1245)
WBC # BLD: 4.1 K/UL — SIGNIFICANT CHANGE UP (ref 3.8–10.5)
WBC # FLD AUTO: 4.1 K/UL — SIGNIFICANT CHANGE UP (ref 3.8–10.5)

## 2018-03-25 PROCEDURE — 99285 EMERGENCY DEPT VISIT HI MDM: CPT

## 2018-03-25 RX ORDER — ASPIRIN/CALCIUM CARB/MAGNESIUM 324 MG
81 TABLET ORAL ONCE
Qty: 0 | Refills: 0 | Status: COMPLETED | OUTPATIENT
Start: 2018-03-25 | End: 2018-03-25

## 2018-03-25 RX ORDER — ACETAMINOPHEN 500 MG
650 TABLET ORAL ONCE
Qty: 0 | Refills: 0 | Status: COMPLETED | OUTPATIENT
Start: 2018-03-25 | End: 2018-03-25

## 2018-03-25 RX ORDER — ATORVASTATIN CALCIUM 80 MG/1
40 TABLET, FILM COATED ORAL AT BEDTIME
Qty: 0 | Refills: 0 | Status: DISCONTINUED | OUTPATIENT
Start: 2018-03-25 | End: 2018-03-27

## 2018-03-25 RX ADMIN — ATORVASTATIN CALCIUM 40 MILLIGRAM(S): 80 TABLET, FILM COATED ORAL at 21:45

## 2018-03-25 RX ADMIN — Medication 650 MILLIGRAM(S): at 00:10

## 2018-03-25 RX ADMIN — Medication 650 MILLIGRAM(S): at 22:21

## 2018-03-25 RX ADMIN — Medication 81 MILLIGRAM(S): at 05:34

## 2018-03-25 RX ADMIN — Medication 650 MILLIGRAM(S): at 01:00

## 2018-03-25 RX ADMIN — Medication 650 MILLIGRAM(S): at 23:04

## 2018-03-25 NOTE — ED ADULT NURSE REASSESSMENT NOTE - NS ED NURSE REASSESS COMMENT FT1
well rested not in any distress on cont. cardiac monitor, on NSR, no complaints , await for bed. Will cont. care.

## 2018-03-25 NOTE — CONSULT NOTE ADULT - ASSESSMENT
57 yr old Female w/ no significant PMH presents to the ED c/o pain/numbness to R arm x 3 days,intermittent chest pain and palpitations..   1.D/c Tele.  2.Echocardiogram.  3.Stress test-R/O ischemia.  4.Neurology eval.  5.Check vit b12 and d.  6.Continue current medication.  7.GI and DVT prophylaxis.

## 2018-03-25 NOTE — H&P ADULT - NSHPPHYSICALEXAM_GEN_ALL_CORE
PHYSICAL EXAM:  GENERAL: NAD, speaks in full sentences, no signs of respiratory distress  HEENT:  Atraumatic, Normocephalic,  EOMI, PERRLA, conjunctiva and sclera clear  NECK: Supple, No JVD  LUNG: Clear to auscultation bilaterally; No wheeze; No crackles; No accessory muscles used  CVS: Regular rate and rhythm, no RMG  ABDOMEN: Soft, Nontender, Nondistended; Bowel sounds present; No guarding  : no dysuria   EXTREMITIES:  2+ Peripheral Pulses, No cyanosis or edema  SKIN: No rashes or lesions  Neuro: AAOx3, non-focal PHYSICAL EXAM:  GENERAL: NAD, speaks in full sentences, no signs of respiratory distress  HEENT:  Atraumatic, Normocephalic,  EOMI, PERRLA, conjunctiva and sclera clear  NECK: Supple, No JVD  LUNG: Clear to auscultation bilaterally; No wheeze; No crackles; No accessory muscles used  CVS: Regular rate and rhythm, systolic murmur   ABDOMEN: Soft, Nontender, Nondistended; Bowel sounds present; No guarding  : no dysuria   EXTREMITIES:  2+ Peripheral Pulses, No cyanosis or edema  SKIN: No rashes or lesions  Neuro: AAOx3, non-focal PHYSICAL EXAM:  GENERAL: NAD, speaks in full sentences, no signs of respiratory distress  HEENT:  Atraumatic, Normocephalic,  EOMI, PERRLA, conjunctiva and sclera clear  NECK: Supple, No JVD  LUNG: Clear to auscultation bilaterally; No wheeze; No crackles; No accessory muscles used  CVS: Regular rate and rhythm, systolic murmur   ABDOMEN: Soft, Nontender, Nondistended; Bowel sounds present; No guarding  : no dysuria   EXTREMITIES:  2+ Peripheral Pulses, Right hand swelling   SKIN: No rashes or lesions  Neuro: AAOx3, non-focal

## 2018-03-25 NOTE — H&P ADULT - PROBLEM SELECTOR PLAN 3
several episodes where her middle finger turns white and becomes painful.   will get rheumatologic work up chronic anemia  f/u iron studies

## 2018-03-25 NOTE — H&P ADULT - PROBLEM SELECTOR PLAN 1
58 yo no PMH with interment chest pain   HEART: 0  EKG: NSR @ 60   telemetry   ASA, statin   echo   Cardio: 56 yo no PMH with interment chest pain   HEART: 0  EKG: NSR @ 60   telemetry   ASA, statin   echo   f/u TSH  Cardio: Dr Bhatti 56 yo no PMH with interment chest pain. Most likely anxiety related   HEART: 0  EKG: NSR @ 60   telemetry   ASA, statin   echo   f/u TSH  Cardio: Dr Bhatti 58 yo no PMH with interment chest pain. Most likely anxiety related   HEART: 0  EKG: NSR @ 60   telemetry   ASA, statin   echo   f/u TSH  Cardio: Dr Lazaro

## 2018-03-25 NOTE — H&P ADULT - PROBLEM SELECTOR PLAN 4
IMPROVE VTE Individual Risk Assessment    RISK                                                          Points  [] Previous VTE                                           3  [] Thrombophilia                                        2  [] Lower limb paralysis                              2   [] Current Cancer                                       2   [] Immobilization > 24 hrs                        1  [] ICU/CCU stay > 24 hours                       1  [] Age > 60                                                   1    IMPROVE VTE Score: 0

## 2018-03-25 NOTE — H&P ADULT - PROBLEM SELECTOR PLAN 2
chronic anemia  f/u iron studies several episodes where her middle finger turns white and becomes painful.   she also complaints of R arm numbness and inability to  her R arm   will get rheumatologic work up several episodes where her middle finger turns white and becomes painful.   she also complaints of R arm numbness and inability to  her R arm   pt could have rheumatic disease since has chronic anemia and right hand swelling   will get rheumatologic work up

## 2018-03-25 NOTE — H&P ADULT - HISTORY OF PRESENT ILLNESS
58 y/o F w/ no significant PMH presents to the ED c/o pain/numbness to R arm x 3 days.PT states numbness in her R arm runs from the upper arm to the middle finger, other fingers are spared.Pt has associated symptoms of  of palpitations and gradual onset of occipital HA with mild CP  with dyspnea on exertion.Pt  recalls  several episodes where her middle finger turns white and becomes painful.  Pt denies fever, N/V, diarrhea, cough, diaphoresis, face/leg numbness, or any other complaints. 58 y/o F w/ no significant PMH presents to the ED c/o pain/numbness to R arm x 3 days. Pt states numbness in her R arm runs from the upper arm to the middle finger, other fingers are spared and states that she cannot move her R arm at all. Pt  recalls  several episodes where her middle finger turns white and becomes painful.Pt has associated symptoms of palpitations and gradual onset of occipital HA with mild CP  with dyspnea on exertion.  Pt denies fever, N/V, diarrhea, cough, diaphoresis, face/leg numbness, or any other complaints.

## 2018-03-25 NOTE — CONSULT NOTE ADULT - SUBJECTIVE AND OBJECTIVE BOX
CHIEF COMPLAINT:Patient is a 57y old  Female who presents with a chief complaint of chest pain, palpitations.      HPI:  57 yr old Female w/ no significant PMH presents to the ED c/o pain/numbness to R arm x 3 days,intermittent chest pain and palpitations.. Pt states numbness in her R arm runs from the upper arm to the middle finger, other fingers are spared and states that she cannot move her R arm at all. Pt  recalls  several episodes where her middle finger turns white and becomes painful.Pt has associated symptoms of palpitations and gradual onset of occipital HA with mild CP  with dyspnea on exertion.  Pt denies fever, N/V, diarrhea, cough, diaphoresis, face/leg numbness, or any other complaints. (25 Mar 2018 05:22)      PAST MEDICAL & SURGICAL HISTORY:  Anemia  H/O abdominoplasty      MEDICATIONS  (STANDING):  atorvastatin 40 milliGRAM(s) Oral at bedtime    MEDICATIONS  (PRN):      FAMILY HISTORY:  No pertinent family history in first degree relatives      SOCIAL HISTORY:    [x ] Non-smoker    [x ] Alcohol-denies    Allergies    No Known Allergies    Intolerances    	    REVIEW OF SYSTEMS:  CONSTITUTIONAL: No fever, weight loss, or fatigue  EYES: No eye pain, visual disturbances, or discharge  ENT:  No difficulty hearing, tinnitus, vertigo; No sinus or throat pain  NECK: No pain or stiffness  RESPIRATORY: No cough, wheezing, chills or hemoptysis; No Shortness of Breath  CARDIOVASCULAR: + chest pain, + palpitations, No passing out, dizziness, or leg swelling  GASTROINTESTINAL: No abdominal or epigastric pain. No nausea, vomiting, or hematemesis; No diarrhea or constipation. No melena or hematochezia.  GENITOURINARY: No dysuria, frequency, hematuria, or incontinence  NEUROLOGICAL: No headaches, memory loss, loss of strength, +numbness  SKIN: No itching, burning, rashes, or lesions   LYMPH Nodes: No enlarged glands  ENDOCRINE: No heat or cold intolerance; No hair loss  MUSCULOSKELETAL: No joint pain or swelling; No muscle, back, or extremity pain  PSYCHIATRIC: No depression, anxiety, mood swings, or difficulty sleeping  HEME/LYMPH: No easy bruising, or bleeding gums  ALLERGY AND IMMUNOLOGIC: No hives or eczema	      PHYSICAL EXAM:  T(C): 36.6 (03-25-18 @ 07:48), Max: 36.9 (03-24-18 @ 18:31)  HR: 62 (03-25-18 @ 07:48) (57 - 90)  BP: 99/47 (03-25-18 @ 07:48) (98/56 - 124/86)  RR: 16 (03-25-18 @ 07:48) (16 - 16)  SpO2: 100% (03-25-18 @ 07:48) (96% - 100%)  Wt(kg): --  I&O's Summary      Appearance: Normal	  HEENT:   Normal oral mucosa, PERRL, EOMI	  Lymphatic: No lymphadenopathy  Cardiovascular: Normal S1 S2, No JVD, No murmurs, No edema  Respiratory: Lungs clear to auscultation	  Psychiatry: A & O x 3, Mood & affect appropriate  Gastrointestinal:  Soft, Non-tender, + BS	  Skin: No rashes, No ecchymoses, No cyanosis	  Neurologic: Non-focal  Extremities: Normal range of motion, No clubbing, cyanosis or edema  Vascular: Peripheral pulses palpable 2+ bilaterally  	    ECG:  nsr with no acute st-t changes	    	  LABS:	 	    CARDIAC MARKERS:  CARDIAC MARKERS ( 25 Mar 2018 10:05 )  <0.015 ng/mL / x     / 85 U/L / x     / 1.2 ng/mL  CARDIAC MARKERS ( 25 Mar 2018 06:11 )  <0.015 ng/mL / x     / x     / x     / x      CARDIAC MARKERS ( 24 Mar 2018 22:15 )  <0.015 ng/mL / x     / x     / x     / x                                  9.9    4.1   )-----------( 204      ( 25 Mar 2018 06:11 )             32.6     03-25    145  |  110<H>  |  14  ----------------------------<  85  3.6   |  25  |  0.64    Ca    8.3<L>      25 Mar 2018 06:11  Phos  3.8     03-25  Mg     2.4     03-25    TPro  7.0  /  Alb  3.6  /  TBili  0.3  /  DBili  x   /  AST  33  /  ALT  20  /  AlkPhos  78  03-24    Lipid Profile: Cholesterol 143  LDL 51  HDL 79  TG 65    HgA1c: Hemoglobin A1C, Whole Blood: 5.2 % (03-25 @ 10:21)    TSH: Thyroid Stimulating Hormone, Serum: 2.49 uU/mL (03-25 @ 06:11)       EXAM:  CT BRAIN                            PROCEDURE DATE:  03/24/2018          INTERPRETATION:  CLINICAL INFORMATION: Headache.    TECHNIQUE: Noncontrast axial CT images were acquired through the head.   Two-dimensional sagittal and coronal reformats were generated.    COMPARISON STUDY: 10/04/2017.    FINDINGS:   There is no CT evidence of acute intracranial hemorrhage, extra-axial   collection, vasogenic edema, mass effect, midline shift, central   herniation, hydrocephalus or transcortical infarct.     The ventricles and sulci are normal in size and configuration.  Mild   dystrophic calcification the basal ganglia is stable.    The visualized paranasal sinuses are clear.    The mastoid air cells and middle ear cavities are grossly clear.    The calvarium, skull base, visualized facial bones and regional soft   tissues are grossly unremarkable.    IMPRESSION:   No CT evidence of acute intracranial hemorrhage, mass or infarct.   Follow-up MRI can be obtained as clinically warranted.    Iron with Total Binding Capacity (03.25.18 @ 10:05)    Iron - Total Binding Capacity.: 394 ug/dL    % Saturation, Iron: 21 %    Iron Total, Serum: 83 ug/dL    Unsaturated Iron Binding Capacity: 311 ug/dL

## 2018-03-25 NOTE — H&P ADULT - ASSESSMENT
58 y/o F w/ no significant PMH presents to the ED c/o pain/numbness to R arm x 3 days.Pt states numbness in her R arm runs from the upper arm to the middle finger, other fingers are spared. Pt has associated symptoms of  of palpitations and gradual onset of occipital HA with mild CP  with dyspnea on exertion. Pt  recalls  several episodes where her middle finger turns white and becomes painful. Admitted for ACS rule out.

## 2018-03-26 ENCOUNTER — TRANSCRIPTION ENCOUNTER (OUTPATIENT)
Age: 58
End: 2018-03-26

## 2018-03-26 DIAGNOSIS — R51 HEADACHE: ICD-10-CM

## 2018-03-26 DIAGNOSIS — R20.8 OTHER DISTURBANCES OF SKIN SENSATION: ICD-10-CM

## 2018-03-26 LAB
24R-OH-CALCIDIOL SERPL-MCNC: 20 NG/ML — LOW (ref 30–80)
24R-OH-CALCIDIOL SERPL-MCNC: 21.9 NG/ML — LOW (ref 30–80)
C3 SERPL-MCNC: 86 MG/DL — SIGNIFICANT CHANGE UP (ref 80–180)
C4 SERPL-MCNC: 19 MG/DL — SIGNIFICANT CHANGE UP (ref 10–45)
ERYTHROCYTE [SEDIMENTATION RATE] IN BLOOD: 11 MM/HR — SIGNIFICANT CHANGE UP (ref 0–20)
VIT B12 SERPL-MCNC: 527 PG/ML — SIGNIFICANT CHANGE UP (ref 232–1245)

## 2018-03-26 PROCEDURE — 99223 1ST HOSP IP/OBS HIGH 75: CPT

## 2018-03-26 RX ORDER — FERROUS SULFATE 325(65) MG
325 TABLET ORAL DAILY
Qty: 0 | Refills: 0 | Status: DISCONTINUED | OUTPATIENT
Start: 2018-03-26 | End: 2018-03-27

## 2018-03-26 RX ORDER — ERGOCALCIFEROL 1.25 MG/1
50000 CAPSULE ORAL
Qty: 0 | Refills: 0 | Status: DISCONTINUED | OUTPATIENT
Start: 2018-03-26 | End: 2018-03-27

## 2018-03-26 RX ORDER — ATORVASTATIN CALCIUM 80 MG/1
1 TABLET, FILM COATED ORAL
Qty: 0 | Refills: 0 | DISCHARGE
Start: 2018-03-26

## 2018-03-26 RX ORDER — ERGOCALCIFEROL 1.25 MG/1
1 CAPSULE ORAL
Qty: 4 | Refills: 0
Start: 2018-03-26 | End: 2018-04-24

## 2018-03-26 RX ADMIN — ATORVASTATIN CALCIUM 40 MILLIGRAM(S): 80 TABLET, FILM COATED ORAL at 21:17

## 2018-03-26 RX ADMIN — Medication 325 MILLIGRAM(S): at 12:01

## 2018-03-26 NOTE — DISCHARGE NOTE ADULT - CARE PROVIDER_API CALL
Abelardo Maddox), Internal Medicine  [address] [address]  Phone: [phone]  Fax: [phone]    Ciara Davies), Neurology  [address] [facility]  [address] [facility] [place] [address]  Phone: [phone]  Fax: [phone]    Daxa Lazaro), Internal Medicine  [address] [facility]  [address] [place] [address]  Phone: [phone]  Fax: [phone]

## 2018-03-26 NOTE — DISCHARGE NOTE ADULT - MEDICATION SUMMARY - MEDICATIONS TO STOP TAKING
I will STOP taking the medications listed below when I get home from the hospital:    predniSONE 20 mg oral tablet  -- 2 tab(s) by mouth once a day for 3 days  1.5 tab by mouth once a day for 3 days  1 tab by mouth once a day for 3 days  -- It is very important that you take or use this exactly as directed.  Do not skip doses or discontinue unless directed by your doctor.  Obtain medical advice before taking any non-prescription drugs as some may affect the action of this medication.  Take with food or milk.

## 2018-03-26 NOTE — PROGRESS NOTE ADULT - SUBJECTIVE AND OBJECTIVE BOX
CHIEF COMPLAINT:Patient is a 57y old  Female who presents with a chief complaint of Left inguinal pain. Pt appears comfortable.    	  REVIEW OF SYSTEMS:  CONSTITUTIONAL: No fever, weight loss, or fatigue  EYES: No eye pain, visual disturbances, or discharge  ENT:  No difficulty hearing, tinnitus, vertigo; No sinus or throat pain  NECK: No pain or stiffness  RESPIRATORY: No cough, wheezing, chills or hemoptysis; No Shortness of Breath  CARDIOVASCULAR: No chest pain, palpitations, passing out, dizziness, or leg swelling  GASTROINTESTINAL: No abdominal or epigastric pain. No nausea, vomiting, or hematemesis; No diarrhea or constipation. No melena or hematochezia.  GENITOURINARY: No dysuria, frequency, hematuria, or incontinence  NEUROLOGICAL: No headaches, memory loss, loss of strength, numbness, or tremors  SKIN: No itching, burning, rashes, or lesions   LYMPH Nodes: No enlarged glands  ENDOCRINE: No heat or cold intolerance; No hair loss  MUSCULOSKELETAL: No joint pain or swelling; No muscle, back, or extremity pain  PSYCHIATRIC: No depression, anxiety, mood swings, or difficulty sleeping  HEME/LYMPH: No easy bruising, or bleeding gums  ALLERGY AND IMMUNOLOGIC: No hives or eczema	      PHYSICAL EXAM:  T(C): 36.7 (03-26-18 @ 07:27), Max: 36.7 (03-26-18 @ 07:27)  HR: 56 (03-26-18 @ 07:27) (54 - 66)  BP: 97/43 (03-26-18 @ 07:27) (92/45 - 100/51)  RR: 18 (03-26-18 @ 07:27) (16 - 18)  SpO2: 100% (03-26-18 @ 07:27) (99% - 100%)  Wt(kg): --  I&O's Summary      Appearance: Normal	  HEENT:   Normal oral mucosa, PERRL, EOMI	  Lymphatic: No lymphadenopathy  Cardiovascular: Normal S1 S2, No JVD, No murmurs, No edema  Respiratory: Lungs clear to auscultation	  Psychiatry: A & O x 3, Mood & affect appropriate  Gastrointestinal:  Soft, Non-tender, + BS	  Skin: No rashes, No ecchymoses, No cyanosis	  Neurologic: Non-focal  Extremities: Normal range of motion, No clubbing, cyanosis or edema  Vascular: Peripheral pulses palpable 2+ bilaterally    MEDICATIONS  (STANDING):  atorvastatin 40 milliGRAM(s) Oral at bedtime      LABS:	 	    CARDIAC MARKERS:  CARDIAC MARKERS ( 25 Mar 2018 10:05 )  <0.015 ng/mL / x     / 85 U/L / x     / 1.2 ng/mL  CARDIAC MARKERS ( 25 Mar 2018 06:11 )  <0.015 ng/mL / x     / x     / x     / x      CARDIAC MARKERS ( 24 Mar 2018 22:15 )  <0.015 ng/mL / x     / x     / x     / x                                    9.9    4.1   )-----------( 204      ( 25 Mar 2018 06:11 )             32.6     03-25    145  |  110<H>  |  14  ----------------------------<  85  3.6   |  25  |  0.64    Ca    8.3<L>      25 Mar 2018 06:11  Phos  3.8     03-25  Mg     2.4     03-25    TPro  7.0  /  Alb  3.6  /  TBili  0.3  /  DBili  x   /  AST  33  /  ALT  20  /  AlkPhos  78  03-24    Lipid Profile: Cholesterol 143  LDL 51  HDL 79  TG 65    HgA1c: Hemoglobin A1C, Whole Blood: 5.2 % (03-25 @ 10:21)    TSH: Thyroid Stimulating Hormone, Serum: 2.49 uU/mL (03-25 @ 06:11)      OBSERVATIONS:  Mitral Valve: Normal mitral valve. Trace mitral  regurgitation.  Aortic Root: Aortic Root: 2.9 cm.    Aortic Valve: Normal trileaflet aortic valve.  Left Atrium: Mild left atrial enlargement.  Left Ventricle:Normal Left Ventricular Systolic Function,  (EF = 55 to 60%) Normal left ventricular internal  dimensions and wall thicknesses. Normal diastolic function.  Right Heart: Normal right atrium. Normal right ventricular  size and function. There is mild tricuspid regurgitation.  There is mild pulmonic regurgitation.  Pericardium/PleuraNormal pericardium with no pericardial  effusion.  Hemodynamic: RV systolic pressure is 35 mm Hg.

## 2018-03-26 NOTE — PROGRESS NOTE ADULT - SUBJECTIVE AND OBJECTIVE BOX
MYNOR SWAN  MRN-[id number]      patient seen and examined    s doing better, occ numbness rt arm , no cp at present     MEDICATIONS  (STANDING):  atorvastatin 40 milliGRAM(s) Oral at bedtime      MEDICATIONS  (PRN):      Allergies    No Known Allergies    Intolerances        PAST MEDICAL & SURGICAL HISTORY:  Anemia  H/O abdominoplasty      FAMILY HISTORY:  No pertinent family history in first degree relatives      SOCIAL HISTORY  Smoking History:     REVIEW OF SYSTEMS:    CONSTITUTIONAL:  Fevers [  ]  Yes  [ x ]  No                                   chills [  ]  Yes  [ x ]  No                               sweats  [  ]  Yes  [ x ]  No                                fatigue [  ]  Yes  [ x ]  No    HEENT:  Eyes:  Diplopia or blurred vision [  ]  Yes  [ x ]  No    ENT:                        earache  [  ]  Yes  [ x ]  No                             sore throat  [  ]  Yes  [ x x]  No                            runny nose.  [  ]  Yes  [  ]  No    CARDIOVASCULAR:       chest pain  [ x ]  Yes  [  ]  No, not today                         squeezing, tightness,  [  ]  Yes  [x  ]  No                                      palpitations.  [  ]  Yes  [ x]  No    RESPIRATORY:             Cough [  ]  Yes  [x  ]  No                                  Wheezing [  ]  Yes  [x  ]  No                                Hemoptysis [  ]  Yes  [  x]  No                                      Sputum [  ]  Yes  [  x]  No                   Shortness of Breathe [  ]  Yes  x[  ]  No    GASTROINTESTINAL:      Abdominal pain  [  ]  Yes  [  x]  No                                                    Nausea  [  ]  Yes  [  x]  No                                                   Vomiting [  ]  Yes  [x  ]  No                                              Constipation [  ]  Yes  [ x ]  No                                                    Diarrhea [  ]  Yes  [ x ]  No    GENITOURINARY:           Incontinence [  ]  Yes  [  ]x  No                                                Dysuria [  ]  Yes  [x  ]  No                                      Blood in Urine  [  ]  Yes  [ x ]  No                          Frequency or urgency.  [  ]  Yes  [x  ]  No    NEUROLOGIC:                     Paresthesias  [  x  Yes  [  ]  No, rt arm                                             fasciculations  [  ]  Yes  [ x ]  No                                seizures or weakness.  [  ]  Yes  [  x]  No                                                   Headache [  ]  Yes  [x  ]  No                                  Loss of Conciousness [  ]  Yes  x[  ]  No                                                     Insomnia [  ]  Yes  [ x ]  No    PSYCHIATRIC:    Disorder of thought or mood.  [  ]  Yes  [ x ]  No                                                           Anxiety [  ]  Yes  [  x]  No                                                      Depression [  ]  Yes  [x  ]  No                                                         Dementia [  ]  Yes  [x  ]  No    Vital Signs Last 24 Hrs  T(C): 36.7 (26 Mar 2018 07:27), Max: 36.7 (26 Mar 2018 07:27)  T(F): 98.1 (26 Mar 2018 07:27), Max: 98.1 (26 Mar 2018 07:27)  HR: 56 (26 Mar 2018 07:27) (54 - 66)  BP: 97/43 (26 Mar 2018 07:27) (92/45 - 100/51)  BP(mean): --  RR: 18 (26 Mar 2018 07:27) (16 - 18)  SpO2: 100% (26 Mar 2018 07:27) (99% - 100%)  I&O's Detail      PHYSICAL EXAMINATION:    GENERAL: The patient is a well-developed, well-nourished _____in no apparent distress.     HEENT: Head is normocephalic and atraumatic. Extraocular muscles are intact. Mucous membranes are moist.     NECK: Supple. jvd [  ]  Yes  [ x ]  No    LUNGS: Clear to auscultation  [ x ]  Yes  [  ]  No                               wheezing, [  ]  Yes  [x  ]  No                                      rales  [  ]  Yes  [  x]  No                                    rhonchi  [  ]  Yes  [  xx]  No                 Respirations labored  [  ]  Yes  [ x ]  No  x  HEART: Regular rate and rhythm  [ x ]  Yes  [  ]  No                                        Murmur [  ]  Yes  [x  ]  No                                              rub  [  ]  Yes  x[  ]  No                                          gallop  [  ]  Yes  [  x]  No    ABDOMEN:                                 Soft, [ x ]  Yes  [  ]  No                                             nontender [ x ]  Yes  [  ]  No                                             distended.[  ]  Yes  [x  ]  No                            hepatosplenomegaly ,[  ]  Yes  [  x]  No                                                    BS   [  x]  Yes  [  ]  No    EXTREMITIES:                cyanosis, [  ]  Yes  [x  ]  No                                       clubbing,   [  ]  Yes  [x  ]  No                                               rash, [  ]  Yes  [  x]  No                                           edema.  [  ]  Yes  [x  ]  No    NEUROLOGIC: Alert and Oriented  [ x ] Person [ x ] Time  [ x] Place                                    Cranial nerves intact    [ x ]  Yes  [  ]  No                                               sensory Intact  [ x ]  Yes  [  ]  No                                                  motor WNL   [x  ]  Yes  [  ]  No                                                Jose Paresis  [  ]  Yes  [ x ]  No  DTR  babinski+ or -      LABS:                        9.9    4.1   )-----------( 204      ( 25 Mar 2018 06:11 )             32.6     03-25    145  |  110<H>  |  14  ----------------------------<  85  3.6   |  25  |  0.64    Ca    8.3<L>      25 Mar 2018 06:11  Phos  3.8     03-25  Mg     2.4     03-25    TPro  7.0  /  Alb  3.6  /  TBili  0.3  /  DBili  x   /  AST  33  /  ALT  20  /  AlkPhos  78  03-24          CARDIAC MARKERS ( 25 Mar 2018 10:05 )  <0.015 ng/mL / x     / 85 U/L / x     / 1.2 ng/mL  CARDIAC MARKERS ( 25 Mar 2018 06:11 )  <0.015 ng/mL / x     / x     / x     / x      CARDIAC MARKERS ( 24 Mar 2018 22:15 )  <0.015 ng/mL / x     / x     / x     / x                    MICROBIOLOGY:    RADIOLOGY & ADDITIONAL STUDIES:< from: CT Head No Cont (03.24.18 @ 22:58) >  IMPRESSION:   No CT evidence of acute intracranial hemorrhage, mass or infarct.   Follow-up MRI can be obtained as clinically warranted.      < end of copied text >  < from: Transthoracic Echocardiogram (03.25.18 @ 06:33) >  ------------------------------------------------------------------------  CONCLUSIONS:  1. Normal mitral valve. Trace mitral regurgitation.  2. Normal trileaflet aortic valve.  3. Aortic Root: 2.9 cm.  4. Mild left atrial enlargement.  5. Normal left ventricular internal dimensions and wall  thicknesses.  6. Normal LeftVentricular Systolic Function,  (EF = 55 to  60%)    < end of copied text >      CXR:    Ct scan chest:    ekg;    echo:

## 2018-03-26 NOTE — DISCHARGE NOTE ADULT - HOSPITAL COURSE
HPI:  56 y/o F w/ history of trauma and chronic anemia presented to the ED c/o pain/numbness to R arm x 3 days. Pt states numbness in her R arm runs from the upper arm to the middle finger, other fingers are spared and states that she cannot move her R arm at all. Pt recalls several episodes where her middle finger turns white and becomes painful. Pt has associated symptoms of palpitations and gradual onset of occipital HA with mild CP with dyspnea on exertion. Pt denies fever, N/V, diarrhea, cough, diaphoresis, face/leg numbness, or any other complaints.    Patient was admitted to r/o cause ACS and CVA as possible causes of chest pain and arm symptoms.    To evaluate cause of chest pain patient was placed on telemetry for 24 hours and then discontinued. Troponin levels were negative x3, CXR was unremarkable, EKG was also unremarkable, echocardiogram showed normal findings and nuclear stress test was negative. To rule out possible CVA a CT of the head was done and showed no signs of intracranial hemorrhage, mass or infarct. An MRI of the head and neck were also done and showed***. Patient was placed on prophylactic aspirin and atorvastatin because of ACS and CVA suspicions. Because of arms symptoms including middle finger turning white Raynaud’s was suspected and a rheumatologic workup was done. Complement studies and rheumatoid factor were drawn and were normal. Patient has history of chronic anemia for which she takes ferrous sulfate and was continued in the hospital. Patient was found to have a hemoglobin of 10.3 on admission and a ferritin of 226 further suggesting possible chronic disease. On admission patient was found to have low vitamin D-25 hydroxy levels and she was given ergocalciferol in the hospital.    Patient is clear for discharge and should follow up with rheumatologist for further workup to find a cause for her current symptoms HPI:  56 y/o F w/ history of trauma and chronic anemia presented to the ED c/o pain/numbness to R arm x 3 days. Pt states numbness in her R arm runs from the upper arm to the middle finger, other fingers are spared and states that she cannot move her R arm at all. Pt recalls several episodes where her middle finger turns white and becomes painful. Pt has associated symptoms of palpitations and gradual onset of occipital HA with mild CP with dyspnea on exertion. Pt denies fever, N/V, diarrhea, cough, diaphoresis, face/leg numbness, or any other complaints.    Patient was admitted to r/o cause ACS and CVA as possible causes of chest pain and arm symptoms.    To evaluate cause of chest pain patient was placed on telemetry for 24 hours and then discontinued. Troponin levels were negative x3, CXR was unremarkable, EKG was also unremarkable, echocardiogram showed normal findings and nuclear stress test was negative. To rule out possible CVA a CT of the head was done and showed no signs of intracranial hemorrhage, mass or infarct. An MRI of the head and neck were also done and showed canal stenosis.  They were neg for acute intracranial process. Patient was placed on prophylactic aspirin and atorvastatin because of ACS and CVA suspicions. Because of arms symptoms including middle finger turning white Raynaud’s was suspected and a rheumatologic workup was done. Complement studies and rheumatoid factor were drawn and were normal. Patient has history of chronic anemia for which she takes ferrous sulfate and was continued in the hospital. Patient was found to have a hemoglobin of 10.3 on admission and a ferritin of 226 further suggesting possible chronic disease. On admission patient was found to have low vitamin D-25 hydroxy levels and she was given ergocalciferol in the hospital.    Patient is clear for discharge and should follow up with rheumatologist for further workup to find a cause for her current symptoms

## 2018-03-26 NOTE — CONSULT NOTE ADULT - SUBJECTIVE AND OBJECTIVE BOX
HPI:  58 y/o F w/ no significant PMH presents to the ED c/o pain/numbness to R arm x 3 days. Pt states numbness in her R arm runs from the upper arm to the middle finger, other fingers are spared and states that she cannot move her R arm at all. Pt  recalls  several episodes where her middle finger turns white and becomes painful.Pt has associated symptoms of palpitations and gradual onset of occipital HA with mild CP  with dyspnea on exertion.  The patient endorses neck pain.  Denies any exacerbating factors.  The patient says that the symptoms started with numbness in the whole right arm.  She now feels better.       Neurological Review of Systems:  No difficulty with language.  No vision loss or double vision.  No dizziness, vertigo or new hearing loss.  No difficulty with speech or swallowing.  No focal weakness.   No numbness or tingling in the bilateral lower extremities.  No difficulty with balance.  No difficulty with ambulation.        MEDICATIONS  (STANDING):  atorvastatin 40 milliGRAM(s) Oral at bedtime  ferrous    sulfate 325 milliGRAM(s) Oral daily    MEDICATIONS  (PRN):    Allergies    No Known Allergies    Intolerances      PAST MEDICAL & SURGICAL HISTORY:  Anemia  H/O abdominoplasty    FAMILY HISTORY:  No pertinent family history in first degree relatives    SOCIAL HISTORY: non smoker    Review of Systems:  Constitutional: No fevers or chills.                    Eyes, Ears, Mouth, Throat: No vision loss   Respiratory: No shortness of breath.                                Cardiovascular: No chest pain currently.  Gastrointestinal: No nausea or vomiting.                                         Genitourinary: No urinary incontinence.  Musculoskeletal: + joint pain.                                                           Dermatologic: No knows rash.  Neurological: as per HPI                                                                      Psychiatric: No behavioral problems.  Endocrine: No known hypoglycemia.               Hematologic/Lymphatic: No easy bleeding.    O:  Vital Signs Last 24 Hrs  T(C): 36.7 (26 Mar 2018 11:39), Max: 36.7 (26 Mar 2018 07:27)  T(F): 98 (26 Mar 2018 11:39), Max: 98.1 (26 Mar 2018 07:27)  HR: 80 (26 Mar 2018 11:39) (54 - 80)  BP: 100/48 (26 Mar 2018 11:39) (92/45 - 100/51)  BP(mean): --  RR: 19 (26 Mar 2018 11:39) (16 - 19)  SpO2: 99% (26 Mar 2018 11:39) (99% - 100%)    General Exam:   General appearance: No acute distress                 Cardiovascular: Pedal dorsalis pulses intact bilaterally    Mental Status: Orientated to self, date and place.  Attention intact.  No dysarthria, aphasia or neglect.  Knowledge intact.  Registration intact.  Short and long term memory grossly intact.      Cranial Nerves: CN I - not tested.  PERRL, EOMI, VFF, no nystagmus or diplopia.  No APD.  Fundi not visualized.  CN V1-3 intact to light touch and pinprick.  No facial asymmetry.  Hearing intact to finger rub bilaterally.  Tongue, uvula and palate midline.  Sternocleidomastoid and Trapezius intact bilaterally.    Motor:   Tone: normal.                  Strength intact throughout  No pronator drift bilaterally                      No dysmetria on finger-nose-finger or heel-shin-heel    Sensation: intact to light touch, pinprick    Deep Tendon Reflexes: 1+ bilateral biceps, triceps, brachioradialis, knee and ankle  Toes flexor bilaterally    Gait: normal and stable.      Other:     LABS:                        9.9    4.1   )-----------( 204      ( 25 Mar 2018 06:11 )             32.6     03-25    145  |  110<H>  |  14  ----------------------------<  85  3.6   |  25  |  0.64    Ca    8.3<L>      25 Mar 2018 06:11  Phos  3.8     03-25  Mg     2.4     03-25    TPro  7.0  /  Alb  3.6  /  TBili  0.3  /  DBili  x   /  AST  33  /  ALT  20  /  AlkPhos  78  03-24            RADIOLOGY & ADDITIONAL STUDIES:    EKG: nsr  < from: CT Head No Cont (03.24.18 @ 22:58) > (iamgdidi reviwed)  IMPRESSION:   No CT evidence of acute intracranial hemorrhage, mass or infarct.   Follow-up MRI can be obtained as clinically warranted.    < end of copied text >

## 2018-03-26 NOTE — DISCHARGE NOTE ADULT - CARE PLAN
Principal Discharge DX:	Chest pain, unspecified type  Secondary Diagnosis:	Acute nonintractable headache, unspecified headache type  Secondary Diagnosis:	Anemia Principal Discharge DX:	Chest pain, unspecified type  Goal:	Acute coronary syndrome ruled out- Please follow up with PCP within 2 weeks  Assessment and plan of treatment:	You presented with rt sided arm pain/ numbness, with palpitations.  You were admitted for ACS, and underwent echo, and stress test which were both WNL. You underwent MRI head and neck concerning for  Secondary Diagnosis:	Acute nonintractable headache, unspecified headache type  Secondary Diagnosis:	Anemia Principal Discharge DX:	Chest pain, unspecified type  Goal:	Acute coronary syndrome ruled out- Please follow up with PCP within 2 weeks  Assessment and plan of treatment:	You presented with rt sided arm pain/ numbness, with palpitations.  You were admitted for ACS, and underwent echo, and stress test which were both WNL. You underwent MRI head and neck concerning for  Secondary Diagnosis:	Anemia  Goal:	Please continue iron therapy, and schedule close follow up with your PCP  Assessment and plan of treatment:	You presented with anemia, likely due to iron deficiency.  Please continue iron therapy, and schedule close follow up with your PCP.  Iron therapy may lead to constipation.  If this occurs please attain over the counter stool softeners, and contact your PCP for further management.  Secondary Diagnosis:	Focal sensory loss  Goal:	Please follow up with Neurology as OP in 2-4 weeks  Assessment and plan of treatment:	You presented with rt sided pain/ numbness.  You were evaluated by neurology who pursed MRI brain and cervical spine which was negative for acute stroke but significant for  Please schedule close follow up with neurology, Dr. Davies, within 2 to 4 weeks of discharge, and PCP within 2 weeks of discharge for follow up. Please continue gabapentin for symptom control. Principal Discharge DX:	Chest pain, unspecified type  Goal:	Acute coronary syndrome ruled out- Please follow up with PCP within 2 weeks  Assessment and plan of treatment:	You presented with rt sided arm pain/ numbness, with palpitations.  You were admitted for ACS, and underwent echo, and stress test which were both WNL. You underwent MRI neck concerning for Mild foraminal narrowing at C5 region.  MRI Brain negative for acute intracranial process.    MR Cervical Spine No Cont (03.27.18 @ 14:26)   IMPRESSION:  Mild degenerative changes. At C4-C5 there is mild bilateral neural   foraminal narrowing, and the right C5 exiting nerve appears slightly   thickened; correlation for impingement in this nerve distribution is   recommended. At C5-C6, there is mild right neural foraminal narrowing.  Secondary Diagnosis:	Anemia  Goal:	Please continue iron therapy, and schedule close follow up with your PCP  Assessment and plan of treatment:	You presented with anemia, likely due to iron deficiency.  Please continue iron therapy, and schedule close follow up with your PCP.  Iron therapy may lead to constipation.  If this occurs please attain over the counter stool softeners, and contact your PCP for further management.  Secondary Diagnosis:	Focal sensory loss  Goal:	Please follow up with Neurology as OP in 2-4 weeks  Assessment and plan of treatment:	You presented with rt sided pain/ numbness.  You were evaluated by neurology who pursed MRI brain and cervical spine which was negative for acute stroke but significant for canal stenosis.    Please schedule close follow up with neurology, Dr. Davies, within 2 to 4 weeks of discharge, and PCP within 2 weeks of discharge for follow up. Please continue gabapentin for symptom control.

## 2018-03-26 NOTE — DISCHARGE NOTE ADULT - PLAN OF CARE
Acute coronary syndrome ruled out- Please follow up with PCP within 2 weeks You presented with rt sided arm pain/ numbness, with palpitations.  You were admitted for ACS, and underwent echo, and stress test which were both WNL. You underwent MRI head and neck concerning for You presented with rt sided pain/ numbness.  You were evaluated by neurology who pursed MRI brain and cervical spine which was negative for acute stroke but significant for  Please schedule close follow up with neurology, Dr. Davies, within 2 to 4 weeks of discharge, and PCP within 2 weeks of discharge for follow up. Please continue gabapentin for symptom control. Please continue iron therapy, and schedule close follow up with your PCP You presented with anemia, likely due to iron deficiency.  Please continue iron therapy, and schedule close follow up with your PCP.  Iron therapy may lead to constipation.  If this occurs please attain over the counter stool softeners, and contact your PCP for further management. Please follow up with Neurology as OP in 2-4 weeks You presented with rt sided arm pain/ numbness, with palpitations.  You were admitted for ACS, and underwent echo, and stress test which were both WNL. You underwent MRI neck concerning for Mild foraminal narrowing at C5 region.  MRI Brain negative for acute intracranial process.    MR Cervical Spine No Cont (03.27.18 @ 14:26)   IMPRESSION:  Mild degenerative changes. At C4-C5 there is mild bilateral neural   foraminal narrowing, and the right C5 exiting nerve appears slightly   thickened; correlation for impingement in this nerve distribution is   recommended. At C5-C6, there is mild right neural foraminal narrowing. You presented with rt sided pain/ numbness.  You were evaluated by neurology who pursed MRI brain and cervical spine which was negative for acute stroke but significant for canal stenosis.    Please schedule close follow up with neurology, Dr. Davies, within 2 to 4 weeks of discharge, and PCP within 2 weeks of discharge for follow up. Please continue gabapentin for symptom control.

## 2018-03-26 NOTE — DISCHARGE NOTE ADULT - MEDICATION SUMMARY - MEDICATIONS TO TAKE
I will START or STAY ON the medications listed below when I get home from the hospital:    atorvastatin 40 mg oral tablet  -- 1 tab(s) by mouth once a day (at bedtime)  -- Indication: For Need for prophylactic measure    zolpidem 10 mg oral tablet  -- 1 tab(s) by mouth once a day (at bedtime)  -- Indication: For Need for prophylactic measure    FeroSul 325 mg (65 mg elemental iron) oral tablet  -- 1 tab(s) by mouth 3 times a day   -- Check with your doctor before becoming pregnant.  Do not chew, break, or crush.  May discolor urine or feces.    -- Indication: For Anemia    Drisdol 50,000 intl units (1.25 mg) oral capsule  -- 1 cap(s) by mouth once a week  -- Indication: For vit D deficiency I will START or STAY ON the medications listed below when I get home from the hospital:    Neurontin 100 mg oral capsule  -- 1 cap(s) by mouth 3 times a day   -- It is very important that you take or use this exactly as directed.  Do not skip doses or discontinue unless directed by your doctor.  May cause drowsiness.  Alcohol may intensify this effect.  Use care when operating dangerous machinery.    -- Indication: For Need for prophylactic measure    atorvastatin 40 mg oral tablet  -- 1 tab(s) by mouth once a day (at bedtime)  -- Indication: For Need for prophylactic measure    zolpidem 10 mg oral tablet  -- 1 tab(s) by mouth once a day (at bedtime)  -- Indication: For Need for prophylactic measure    FeroSul 325 mg (65 mg elemental iron) oral tablet  -- 1 tab(s) by mouth 3 times a day   -- Check with your doctor before becoming pregnant.  Do not chew, break, or crush.  May discolor urine or feces.    -- Indication: For Anemia    Drisdol 50,000 intl units (1.25 mg) oral capsule  -- 1 cap(s) by mouth once a week  -- Indication: For vit D deficiency

## 2018-03-26 NOTE — CONSULT NOTE ADULT - ASSESSMENT
Right arm numbness and tingling in patient with neck pain likely due to cervical radiculopathy, however given the fact that patient endorses symptoms in the whole arm at the start will recommend to get MRI brain to r/o stroke. If MRI brain is negative for stroke can discharge with outpatient followup with neurology in 2-4 weeks. Right arm numbness and tingling in patient with neck pain likely due to cervical radiculopathy, however given the fact that patient endorses symptoms in the whole arm at the start will recommend to get MRI brain to r/o stroke. Neurontin 100mg tid for pain. If MRI brain is negative for stroke can discharge with outpatient followup with neurology in 2-4 weeks.

## 2018-03-26 NOTE — PROGRESS NOTE ADULT - ASSESSMENT
57 yr old Female w/ no significant PMH presents to the ED c/o pain/numbness to R arm x 3 days,intermittent chest pain and palpitations..   1.Stress test-R/O ischemia.  2.Neurology eval.  3.Continue current medication.  4.GI and DVT prophylaxis.

## 2018-03-26 NOTE — DISCHARGE NOTE ADULT - PATIENT PORTAL LINK FT
You can access the [facility] Patient Portal, offered by [facility], by registering with the following website: [email or web address]

## 2018-03-26 NOTE — DISCHARGE NOTE ADULT - CARE PROVIDERS DIRECT ADDRESSES
,DirectAddress_Unknown,[email or web address],DirectAddress_Unknown

## 2018-03-27 VITALS — HEART RATE: 77 BPM | SYSTOLIC BLOOD PRESSURE: 85 MMHG | DIASTOLIC BLOOD PRESSURE: 38 MMHG

## 2018-03-27 LAB
ANION GAP SERPL CALC-SCNC: 7 MMOL/L — SIGNIFICANT CHANGE UP (ref 5–17)
BUN SERPL-MCNC: 19 MG/DL — HIGH (ref 7–18)
CALCIUM SERPL-MCNC: 8.7 MG/DL — SIGNIFICANT CHANGE UP (ref 8.4–10.5)
CHLORIDE SERPL-SCNC: 109 MMOL/L — HIGH (ref 96–108)
CO2 SERPL-SCNC: 26 MMOL/L — SIGNIFICANT CHANGE UP (ref 22–31)
CREAT SERPL-MCNC: 0.75 MG/DL — SIGNIFICANT CHANGE UP (ref 0.5–1.3)
GLUCOSE SERPL-MCNC: 88 MG/DL — SIGNIFICANT CHANGE UP (ref 70–99)
HCT VFR BLD CALC: 37.3 % — SIGNIFICANT CHANGE UP (ref 34.5–45)
HGB BLD-MCNC: 11.3 G/DL — LOW (ref 11.5–15.5)
MCHC RBC-ENTMCNC: 29 PG — SIGNIFICANT CHANGE UP (ref 27–34)
MCHC RBC-ENTMCNC: 30.2 GM/DL — LOW (ref 32–36)
MCV RBC AUTO: 95.8 FL — SIGNIFICANT CHANGE UP (ref 80–100)
PLATELET # BLD AUTO: 229 K/UL — SIGNIFICANT CHANGE UP (ref 150–400)
POTASSIUM SERPL-MCNC: 4.2 MMOL/L — SIGNIFICANT CHANGE UP (ref 3.5–5.3)
POTASSIUM SERPL-SCNC: 4.2 MMOL/L — SIGNIFICANT CHANGE UP (ref 3.5–5.3)
RBC # BLD: 3.89 M/UL — SIGNIFICANT CHANGE UP (ref 3.8–5.2)
RBC # FLD: 15.7 % — HIGH (ref 10.3–14.5)
SODIUM SERPL-SCNC: 142 MMOL/L — SIGNIFICANT CHANGE UP (ref 135–145)
WBC # BLD: 5 K/UL — SIGNIFICANT CHANGE UP (ref 3.8–10.5)
WBC # FLD AUTO: 5 K/UL — SIGNIFICANT CHANGE UP (ref 3.8–10.5)

## 2018-03-27 PROCEDURE — 72141 MRI NECK SPINE W/O DYE: CPT

## 2018-03-27 PROCEDURE — 80048 BASIC METABOLIC PNL TOTAL CA: CPT

## 2018-03-27 PROCEDURE — 83036 HEMOGLOBIN GLYCOSYLATED A1C: CPT

## 2018-03-27 PROCEDURE — 93005 ELECTROCARDIOGRAM TRACING: CPT

## 2018-03-27 PROCEDURE — A9502: CPT

## 2018-03-27 PROCEDURE — 84443 ASSAY THYROID STIM HORMONE: CPT

## 2018-03-27 PROCEDURE — 86038 ANTINUCLEAR ANTIBODIES: CPT

## 2018-03-27 PROCEDURE — 86140 C-REACTIVE PROTEIN: CPT

## 2018-03-27 PROCEDURE — 78452 HT MUSCLE IMAGE SPECT MULT: CPT

## 2018-03-27 PROCEDURE — 70551 MRI BRAIN STEM W/O DYE: CPT

## 2018-03-27 PROCEDURE — 99285 EMERGENCY DEPT VISIT HI MDM: CPT | Mod: 25

## 2018-03-27 PROCEDURE — 85652 RBC SED RATE AUTOMATED: CPT

## 2018-03-27 PROCEDURE — 70450 CT HEAD/BRAIN W/O DYE: CPT

## 2018-03-27 PROCEDURE — 82728 ASSAY OF FERRITIN: CPT

## 2018-03-27 PROCEDURE — 82306 VITAMIN D 25 HYDROXY: CPT

## 2018-03-27 PROCEDURE — 71046 X-RAY EXAM CHEST 2 VIEWS: CPT

## 2018-03-27 PROCEDURE — 86160 COMPLEMENT ANTIGEN: CPT

## 2018-03-27 PROCEDURE — 82553 CREATINE MB FRACTION: CPT

## 2018-03-27 PROCEDURE — 84484 ASSAY OF TROPONIN QUANT: CPT

## 2018-03-27 PROCEDURE — 83735 ASSAY OF MAGNESIUM: CPT

## 2018-03-27 PROCEDURE — 93306 TTE W/DOPPLER COMPLETE: CPT

## 2018-03-27 PROCEDURE — G0378: CPT

## 2018-03-27 PROCEDURE — 83550 IRON BINDING TEST: CPT

## 2018-03-27 PROCEDURE — 80053 COMPREHEN METABOLIC PANEL: CPT

## 2018-03-27 PROCEDURE — 85027 COMPLETE CBC AUTOMATED: CPT

## 2018-03-27 PROCEDURE — 82550 ASSAY OF CK (CPK): CPT

## 2018-03-27 PROCEDURE — 82746 ASSAY OF FOLIC ACID SERUM: CPT

## 2018-03-27 PROCEDURE — 82607 VITAMIN B-12: CPT

## 2018-03-27 PROCEDURE — 80061 LIPID PANEL: CPT

## 2018-03-27 PROCEDURE — 93017 CV STRESS TEST TRACING ONLY: CPT

## 2018-03-27 PROCEDURE — 84100 ASSAY OF PHOSPHORUS: CPT

## 2018-03-27 PROCEDURE — 86431 RHEUMATOID FACTOR QUANT: CPT

## 2018-03-27 PROCEDURE — 72141 MRI NECK SPINE W/O DYE: CPT | Mod: 26

## 2018-03-27 PROCEDURE — 70551 MRI BRAIN STEM W/O DYE: CPT | Mod: 26

## 2018-03-27 RX ORDER — GABAPENTIN 400 MG/1
1 CAPSULE ORAL
Qty: 90 | Refills: 0
Start: 2018-03-27 | End: 2018-04-25

## 2018-03-27 RX ADMIN — Medication 325 MILLIGRAM(S): at 14:50

## 2018-03-27 RX ADMIN — ERGOCALCIFEROL 50000 UNIT(S): 1.25 CAPSULE ORAL at 14:50

## 2018-03-27 NOTE — PROGRESS NOTE ADULT - PROBLEM SELECTOR PLAN 2
several episodes where her middle finger turns white and becomes painful.   she also complaints of R arm numbness and inability to  her R arm   pt could have rheumatic disease since has chronic anemia and right hand swelling   will get rheumatologic work up -Rt arm pain/ numbness: Chronic  -Likely 2/2 cervical canal stenosis  -possibly reynauds, but ESR/ RF WNL  -f/u Brain MRI/ Cervical MRI  -Neuro: Dr. Davies

## 2018-03-27 NOTE — PROGRESS NOTE ADULT - PROBLEM SELECTOR PLAN 3
chronic anemia  f/u iron studies -chronic anemia  -likely from blood loss vs chronic disease   -c/w ferrous sulfate therapy

## 2018-03-27 NOTE — PROGRESS NOTE ADULT - ASSESSMENT
56 y/o F w/ no significant PMH presents to the ED c/o pain/numbness to R arm x 3 days.Pt states numbness in her R arm runs from the upper arm to the middle finger, other fingers are spared. Pt has associated symptoms of  of palpitations and gradual onset of occipital HA with mild CP  with dyspnea on exertion. Pt  recalls  several episodes where her middle finger turns white and becomes painful. Admitted for ACS rule out. 58 y/o F w/ no significant PMH presents to the ED c/o pain/numbness to R arm x 3 days.Pt states numbness in her R arm runs from the upper arm to the middle finger, other fingers are spared. Pt has associated symptoms of  of palpitations and gradual onset of occipital HA with mild CP  with dyspnea on exertion. Pt  recalls  several episodes where her middle finger turns white and becomes painful. Admitted for ACS rule out vs. CVA.

## 2018-03-27 NOTE — PROGRESS NOTE ADULT - PROBLEM SELECTOR PLAN 4
add [id number]   out patient work up
IMPROVE VTE Individual Risk Assessment    RISK                                                          Points  [] Previous VTE                                           3  [] Thrombophilia                                        2  [] Lower limb paralysis                              2   [] Current Cancer                                       2   [] Immobilization > 24 hrs                        1  [] ICU/CCU stay > 24 hours                       1  [] Age > 60                                                   1    IMPROVE VTE Score: 0

## 2018-03-27 NOTE — PROGRESS NOTE ADULT - ATTENDING COMMENTS
patient was seen and examined along with resident   above reviewed and agreed  if mri ok and cleared by neurology then d/c home

## 2018-03-27 NOTE — PROGRESS NOTE ADULT - PROBLEM SELECTOR PLAN 1
56 yo no PMH with interment chest pain. Most likely anxiety related   HEART: 0  EKG: NSR @ 60   telemetry   ASA, statin   echo   f/u TSH  Cardio: Dr Lazaro 58 yo no PMH with interment chest pain. Most likely anxiety related   HEART: 0  EKG: NSR @ 60   Trops neg x 3  echo WNL EF 55-60%  Stress test WNL  TSH WNL   Cardio: Dr Lazaro

## 2018-03-27 NOTE — PROGRESS NOTE ADULT - ASSESSMENT
57 yr old Female w/ no significant PMH presents to the ED c/o pain/numbness to R arm x 3 days,intermittent chest pain and palpitations..   1.Neurology eval.  2.Continue current medication.  3.GI and DVT prophylaxis.

## 2018-03-27 NOTE — PROGRESS NOTE ADULT - SUBJECTIVE AND OBJECTIVE BOX
CHIEF COMPLAINT:Patient is a 57y old  Female who presents with a chief complaint of chest pain. Pt appears comfortable.    	  REVIEW OF SYSTEMS:  CONSTITUTIONAL: No fever, weight loss, or fatigue  EYES: No eye pain, visual disturbances, or discharge  ENT:  No difficulty hearing, tinnitus, vertigo; No sinus or throat pain  NECK: No pain or stiffness  RESPIRATORY: No cough, wheezing, chills or hemoptysis; No Shortness of Breath  CARDIOVASCULAR: No chest pain, palpitations, passing out, dizziness, or leg swelling  GASTROINTESTINAL: No abdominal or epigastric pain. No nausea, vomiting, or hematemesis; No diarrhea or constipation. No melena or hematochezia.  GENITOURINARY: No dysuria, frequency, hematuria, or incontinence  NEUROLOGICAL: No headaches, memory loss, loss of strength, numbness, or tremors  SKIN: No itching, burning, rashes, or lesions   LYMPH Nodes: No enlarged glands  ENDOCRINE: No heat or cold intolerance; No hair loss  MUSCULOSKELETAL: No joint pain or swelling; No muscle, back, or extremity pain  PSYCHIATRIC: No depression, anxiety, mood swings, or difficulty sleeping  HEME/LYMPH: No easy bruising, or bleeding gums  ALLERGY AND IMMUNOLOGIC: No hives or eczema	      PHYSICAL EXAM:  T(C): 36.6 (03-27-18 @ 07:43), Max: 36.7 (03-26-18 @ 18:56)  HR: 64 (03-27-18 @ 07:43) (63 - 73)  BP: 92/47 (03-27-18 @ 07:43) (85/52 - 107/55)  RR: 16 (03-27-18 @ 07:43) (16 - 18)  SpO2: 97% (03-27-18 @ 07:43) (97% - 100%)    Appearance: Normal	  HEENT:   Normal oral mucosa, PERRL, EOMI	  Lymphatic: No lymphadenopathy  Cardiovascular: Normal S1 S2, No JVD, No murmurs, No edema  Respiratory: Lungs clear to auscultation	  Psychiatry: A & O x 3, Mood & affect appropriate  Gastrointestinal:  Soft, Non-tender, + BS	  Skin: No rashes, No ecchymoses, No cyanosis	  Neurologic: Non-focal  Extremities: Normal range of motion, No clubbing, cyanosis or edema  Vascular: Peripheral pulses palpable 2+ bilaterally    MEDICATIONS  (STANDING):  atorvastatin 40 milliGRAM(s) Oral at bedtime  ergocalciferol 46001 Unit(s) Oral <User Schedule>  ferrous    sulfate 325 milliGRAM(s) Oral daily      	  LABS:	 	                        11.3   5.0   )-----------( 229      ( 27 Mar 2018 08:23 )             37.3     03-27    142  |  109<H>  |  19<H>  ----------------------------<  88  4.2   |  26  |  0.75    Ca    8.7      27 Mar 2018 08:23        Lipid Profile: Cholesterol 143  LDL 51  HDL 79  TG 65    HgA1c: Hemoglobin A1C, Whole Blood: 5.2 % (03-25 @ 10:21)    TSH: Thyroid Stimulating Hormone, Serum: 2.49 uU/mL (03-25 @ 06:11)      IMPRESSIONS:Normal Study  * Negative ECG evidence of ischemia after IV of Lexiscan.  * Review of raw data shows: The study is of good technical  quality.  * The left ventricle was normal in size. Normal myocardial  perfusion scan, with no evidence of infarction or  inducible ischemia.  * Post-stress resting myocardial perfusion gated SPECT  imaging was performed (LVEF > 70%)

## 2018-03-27 NOTE — PROGRESS NOTE ADULT - SUBJECTIVE AND OBJECTIVE BOX
PGY 1 Note discussed with supervising resident and primary attending    Patient is a 57y old  Female who presents with a chief complaint of Left inguinal pain -- ACS r/o (26 Mar 2018 15:52)      INTERVAL HPI/OVERNIGHT EVENTS: No acute events reported overnight.    Today pt presents in no acute distress.  Pt found resting comfortably in bed.      MEDICATIONS  (STANDING):  atorvastatin 40 milliGRAM(s) Oral at bedtime  ergocalciferol 94634 Unit(s) Oral <User Schedule>  ferrous sulfate 325 milliGRAM(s) Oral daily    __________________________________________________  REVIEW OF SYSTEMS:    CONSTITUTIONAL: No fever,   EYES: no acute visual disturbances  NECK: No pain or stiffness  RESPIRATORY: No cough; No shortness of breath  CARDIOVASCULAR: No chest pain, no palpitations  GASTROINTESTINAL: No pain. No nausea or vomiting; No diarrhea   NEUROLOGICAL: No headache or numbness, no tremors  MUSCULOSKELETAL: No joint pain, no muscle pain       Vital Signs Last 24 Hrs  T(C): 36.7 (26 Mar 2018 23:58), Max: 36.7 (26 Mar 2018 11:39)  T(F): 98.1 (26 Mar 2018 23:58), Max: 98.1 (26 Mar 2018 23:58)  HR: 63 (26 Mar 2018 23:58) (63 - 80)  BP: 92/37 (26 Mar 2018 23:58) (85/52 - 107/55)  RR: 16 (26 Mar 2018 23:58) (16 - 19)  SpO2: 100% (26 Mar 2018 23:58) (98% - 100%)    ________________________________________________  PHYSICAL EXAM:  GENERAL: NAD  HEENT: Normocephalic;  conjunctivae and sclerae clear; moist mucous membranes;   NECK : supple  CHEST/LUNG: Clear to auscultation bilaterally with good air entry   HEART: S1 S2  regular; no murmurs, gallops or rubs  ABDOMEN: Soft, Nontender, Nondistended; Bowel sounds present  EXTREMITIES: no cyanosis; no edema; no calf tenderness  NERVOUS SYSTEM:  Awake and alert; Oriented  to place, person and time ;       RADIOLOGY & ADDITIONAL TESTS:    Imaging Personally Reviewed:  YES    Consultant(s) Notes Reviewed:   YES    Care Discussed with Consultants :     Plan of care was discussed with patient and /or primary care giver; all questions and concerns were addressed and care was aligned with patient's wishes. PGY 1 Note discussed with supervising resident and primary attending    Patient is a 57y old  Female who presents with a chief complaint of Left inguinal pain -- ACS r/o (26 Mar 2018 15:52)    INTERVAL HPI/OVERNIGHT EVENTS: No acute events reported overnight.    Today pt presents in no acute distress.  Pt found resting comfortably in bed. continues to complain of rt arm pain/ numbness, but states symptoms have been chronic.     MEDICATIONS  (STANDING):  atorvastatin 40 milliGRAM(s) Oral at bedtime  ergocalciferol 49342 Unit(s) Oral <User Schedule>  ferrous    sulfate 325 milliGRAM(s) Oral daily      __________________________________________________  REVIEW OF SYSTEMS:    CONSTITUTIONAL: No fever,   EYES: no acute visual disturbances  NECK: No pain or stiffness  RESPIRATORY: No cough; No shortness of breath  CARDIOVASCULAR: No chest pain, no palpitations  GASTROINTESTINAL: No pain. No nausea or vomiting; No diarrhea   NEUROLOGICAL: Lt arm pain/numbness  MUSCULOSKELETAL: No joint pain, no muscle pain       Vital Signs Last 24 Hrs  T(C): 36.7 (26 Mar 2018 23:58), Max: 36.7 (26 Mar 2018 11:39)  T(F): 98.1 (26 Mar 2018 23:58), Max: 98.1 (26 Mar 2018 23:58)  HR: 63 (26 Mar 2018 23:58) (63 - 80)  BP: 92/37 (26 Mar 2018 23:58) (85/52 - 107/55)  RR: 16 (26 Mar 2018 23:58) (16 - 19)  SpO2: 100% (26 Mar 2018 23:58) (98% - 100%)    ________________________________________________  PHYSICAL EXAM:  GENERAL: NAD  HEENT: Normocephalic;  conjunctivae and sclerae clear; moist mucous membranes;   NECK : supple  CHEST/LUNG: Clear to auscultation bilaterally with good air entry   HEART: S1 S2  regular; no murmurs, gallops or rubs  ABDOMEN: Soft, Nontender, Nondistended; Bowel sounds present  EXTREMITIES: no cyanosis; no edema; no calf tenderness  NERVOUS SYSTEM:  Awake and alert; Oriented  to place, person and time ;       RADIOLOGY & ADDITIONAL TESTS:    Imaging Personally Reviewed:  YES    Consultant(s) Notes Reviewed:   YES    Care Discussed with Consultants :     Plan of care was discussed with patient and /or primary care giver; all questions and concerns were addressed and care was aligned with patient's wishes.

## 2018-03-28 LAB
ANA PAT FLD IF-IMP: ABNORMAL
ANA TITR SER: ABNORMAL

## 2018-04-25 ENCOUNTER — APPOINTMENT (OUTPATIENT)
Dept: OTHER | Facility: CLINIC | Age: 58
End: 2018-04-25

## 2018-07-11 ENCOUNTER — APPOINTMENT (OUTPATIENT)
Dept: OTHER | Facility: CLINIC | Age: 58
End: 2018-07-11

## 2018-07-16 NOTE — PATIENT PROFILE ADULT. - FUNCTIONAL SCREEN CURRENT LEVEL: COMMUNICATION, MLM
Called patient and LM. Will wait for return phone call. Tonja Beavers LPN   (0) understands/communicates without difficulty

## 2018-08-20 ENCOUNTER — EMERGENCY (EMERGENCY)
Facility: HOSPITAL | Age: 58
LOS: 1 days | Discharge: HOME | End: 2018-08-20
Attending: EMERGENCY MEDICINE
Payer: MEDICARE

## 2018-08-20 VITALS
SYSTOLIC BLOOD PRESSURE: 145 MMHG | DIASTOLIC BLOOD PRESSURE: 85 MMHG | WEIGHT: 139.99 LBS | HEART RATE: 65 BPM | TEMPERATURE: 98 F | OXYGEN SATURATION: 100 % | RESPIRATION RATE: 16 BRPM | HEIGHT: 62 IN

## 2018-08-20 DIAGNOSIS — Z98.89 OTHER SPECIFIED POSTPROCEDURAL STATES: Chronic | ICD-10-CM

## 2018-08-20 PROCEDURE — 99284 EMERGENCY DEPT VISIT MOD MDM: CPT | Mod: 25

## 2018-08-20 RX ORDER — METOCLOPRAMIDE HCL 10 MG
10 TABLET ORAL ONCE
Qty: 0 | Refills: 0 | Status: COMPLETED | OUTPATIENT
Start: 2018-08-20 | End: 2018-08-21

## 2018-08-20 RX ORDER — SODIUM CHLORIDE 9 MG/ML
1000 INJECTION INTRAMUSCULAR; INTRAVENOUS; SUBCUTANEOUS ONCE
Qty: 0 | Refills: 0 | Status: COMPLETED | OUTPATIENT
Start: 2018-08-20 | End: 2018-08-21

## 2018-08-21 VITALS
TEMPERATURE: 98 F | RESPIRATION RATE: 18 BRPM | HEART RATE: 71 BPM | DIASTOLIC BLOOD PRESSURE: 78 MMHG | OXYGEN SATURATION: 98 % | SYSTOLIC BLOOD PRESSURE: 137 MMHG

## 2018-08-21 LAB
ALBUMIN SERPL ELPH-MCNC: 3.5 G/DL — SIGNIFICANT CHANGE UP (ref 3.5–5)
ALP SERPL-CCNC: 75 U/L — SIGNIFICANT CHANGE UP (ref 40–120)
ALT FLD-CCNC: 22 U/L DA — SIGNIFICANT CHANGE UP (ref 10–60)
ANION GAP SERPL CALC-SCNC: 5 MMOL/L — SIGNIFICANT CHANGE UP (ref 5–17)
APTT BLD: 25.1 SEC — LOW (ref 27.5–37.4)
AST SERPL-CCNC: 23 U/L — SIGNIFICANT CHANGE UP (ref 10–40)
BASOPHILS # BLD AUTO: 0.1 K/UL — SIGNIFICANT CHANGE UP (ref 0–0.2)
BASOPHILS NFR BLD AUTO: 1.1 % — SIGNIFICANT CHANGE UP (ref 0–2)
BILIRUB SERPL-MCNC: 0.4 MG/DL — SIGNIFICANT CHANGE UP (ref 0.2–1.2)
BUN SERPL-MCNC: 17 MG/DL — SIGNIFICANT CHANGE UP (ref 7–18)
CALCIUM SERPL-MCNC: 8.4 MG/DL — SIGNIFICANT CHANGE UP (ref 8.4–10.5)
CHLORIDE SERPL-SCNC: 108 MMOL/L — SIGNIFICANT CHANGE UP (ref 96–108)
CO2 SERPL-SCNC: 29 MMOL/L — SIGNIFICANT CHANGE UP (ref 22–31)
CREAT SERPL-MCNC: 0.75 MG/DL — SIGNIFICANT CHANGE UP (ref 0.5–1.3)
EOSINOPHIL # BLD AUTO: 0.2 K/UL — SIGNIFICANT CHANGE UP (ref 0–0.5)
EOSINOPHIL NFR BLD AUTO: 4.3 % — SIGNIFICANT CHANGE UP (ref 0–6)
GLUCOSE SERPL-MCNC: 105 MG/DL — HIGH (ref 70–99)
HCT VFR BLD CALC: 32.1 % — LOW (ref 34.5–45)
HGB BLD-MCNC: 10.7 G/DL — LOW (ref 11.5–15.5)
INR BLD: 0.99 RATIO — SIGNIFICANT CHANGE UP (ref 0.88–1.16)
LYMPHOCYTES # BLD AUTO: 1.7 K/UL — SIGNIFICANT CHANGE UP (ref 1–3.3)
LYMPHOCYTES # BLD AUTO: 34.1 % — SIGNIFICANT CHANGE UP (ref 13–44)
MCHC RBC-ENTMCNC: 33 PG — SIGNIFICANT CHANGE UP (ref 27–34)
MCHC RBC-ENTMCNC: 33.4 GM/DL — SIGNIFICANT CHANGE UP (ref 32–36)
MCV RBC AUTO: 98.7 FL — SIGNIFICANT CHANGE UP (ref 80–100)
MONOCYTES # BLD AUTO: 0.6 K/UL — SIGNIFICANT CHANGE UP (ref 0–0.9)
MONOCYTES NFR BLD AUTO: 12.1 % — SIGNIFICANT CHANGE UP (ref 2–14)
NEUTROPHILS # BLD AUTO: 2.4 K/UL — SIGNIFICANT CHANGE UP (ref 1.8–7.4)
NEUTROPHILS NFR BLD AUTO: 48.4 % — SIGNIFICANT CHANGE UP (ref 43–77)
PLATELET # BLD AUTO: 184 K/UL — SIGNIFICANT CHANGE UP (ref 150–400)
POTASSIUM SERPL-MCNC: 3.8 MMOL/L — SIGNIFICANT CHANGE UP (ref 3.5–5.3)
POTASSIUM SERPL-SCNC: 3.8 MMOL/L — SIGNIFICANT CHANGE UP (ref 3.5–5.3)
PROT SERPL-MCNC: 6.8 G/DL — SIGNIFICANT CHANGE UP (ref 6–8.3)
PROTHROM AB SERPL-ACNC: 10.8 SEC — SIGNIFICANT CHANGE UP (ref 9.8–12.7)
RBC # BLD: 3.25 M/UL — LOW (ref 3.8–5.2)
RBC # FLD: 12.6 % — SIGNIFICANT CHANGE UP (ref 10.3–14.5)
SODIUM SERPL-SCNC: 142 MMOL/L — SIGNIFICANT CHANGE UP (ref 135–145)
WBC # BLD: 5 K/UL — SIGNIFICANT CHANGE UP (ref 3.8–10.5)
WBC # FLD AUTO: 5 K/UL — SIGNIFICANT CHANGE UP (ref 3.8–10.5)

## 2018-08-21 PROCEDURE — 96365 THER/PROPH/DIAG IV INF INIT: CPT

## 2018-08-21 PROCEDURE — 82962 GLUCOSE BLOOD TEST: CPT

## 2018-08-21 PROCEDURE — 80053 COMPREHEN METABOLIC PANEL: CPT

## 2018-08-21 PROCEDURE — 85027 COMPLETE CBC AUTOMATED: CPT

## 2018-08-21 PROCEDURE — 96366 THER/PROPH/DIAG IV INF ADDON: CPT

## 2018-08-21 PROCEDURE — 85730 THROMBOPLASTIN TIME PARTIAL: CPT

## 2018-08-21 PROCEDURE — 85610 PROTHROMBIN TIME: CPT

## 2018-08-21 PROCEDURE — 70450 CT HEAD/BRAIN W/O DYE: CPT | Mod: 26

## 2018-08-21 PROCEDURE — 70450 CT HEAD/BRAIN W/O DYE: CPT

## 2018-08-21 PROCEDURE — 99284 EMERGENCY DEPT VISIT MOD MDM: CPT | Mod: 25

## 2018-08-21 RX ORDER — IBUPROFEN 200 MG
1 TABLET ORAL
Qty: 15 | Refills: 0
Start: 2018-08-21

## 2018-08-21 RX ORDER — KETOROLAC TROMETHAMINE 30 MG/ML
15 SYRINGE (ML) INJECTION ONCE
Qty: 0 | Refills: 0 | Status: DISCONTINUED | OUTPATIENT
Start: 2018-08-21 | End: 2018-08-21

## 2018-08-21 RX ADMIN — Medication 15 MILLIGRAM(S): at 03:10

## 2018-08-21 RX ADMIN — SODIUM CHLORIDE 1000 MILLILITER(S): 9 INJECTION INTRAMUSCULAR; INTRAVENOUS; SUBCUTANEOUS at 03:05

## 2018-08-21 RX ADMIN — Medication 10 MILLIGRAM(S): at 03:06

## 2018-08-21 RX ADMIN — Medication 15 MILLIGRAM(S): at 03:05

## 2018-08-21 NOTE — ED PROVIDER NOTE - OBJECTIVE STATEMENT
59 y/o F pt with PMHx of anemia presents to ED with complaints of HA. Pt reports she has nausea, vomiting and numbness in her R hand. She denies Hx of headaches in the past, and denies fever, chills or any other complaints.

## 2018-08-21 NOTE — ED ADULT NURSE NOTE - NSIMPLEMENTINTERV_GEN_ALL_ED
Implemented All Universal Safety Interventions:  [place] to call system. Call bell, personal items and telephone within reach. Instruct patient to call for assistance. Room bathroom lighting operational. Non-slip footwear when patient is off stretcher. Physically safe environment: no spills, clutter or unnecessary equipment. Stretcher in lowest position, wheels locked, appropriate side rails in place.

## 2018-09-12 ENCOUNTER — APPOINTMENT (OUTPATIENT)
Dept: OTHER | Facility: CLINIC | Age: 58
End: 2018-09-12
Payer: COMMERCIAL

## 2018-09-12 VITALS
WEIGHT: 138 LBS | HEART RATE: 70 BPM | OXYGEN SATURATION: 100 % | BODY MASS INDEX: 25.4 KG/M2 | SYSTOLIC BLOOD PRESSURE: 106 MMHG | HEIGHT: 62 IN | RESPIRATION RATE: 16 BRPM | DIASTOLIC BLOOD PRESSURE: 70 MMHG

## 2018-09-12 PROCEDURE — 99214 OFFICE O/P EST MOD 30 MIN: CPT

## 2018-11-27 ENCOUNTER — EMERGENCY (EMERGENCY)
Facility: HOSPITAL | Age: 58
LOS: 1 days | Discharge: HOME | End: 2018-11-27
Attending: EMERGENCY MEDICINE
Payer: MEDICARE

## 2018-11-27 VITALS
TEMPERATURE: 99 F | RESPIRATION RATE: 18 BRPM | SYSTOLIC BLOOD PRESSURE: 124 MMHG | HEART RATE: 80 BPM | OXYGEN SATURATION: 100 % | WEIGHT: 134.04 LBS | DIASTOLIC BLOOD PRESSURE: 84 MMHG | HEIGHT: 62 IN

## 2018-11-27 DIAGNOSIS — Z98.89 OTHER SPECIFIED POSTPROCEDURAL STATES: Chronic | ICD-10-CM

## 2018-11-27 PROCEDURE — 99282 EMERGENCY DEPT VISIT SF MDM: CPT

## 2018-11-27 PROCEDURE — 10060 I&D ABSCESS SIMPLE/SINGLE: CPT

## 2018-11-27 PROCEDURE — 99282 EMERGENCY DEPT VISIT SF MDM: CPT | Mod: 25

## 2018-11-27 NOTE — ED PROVIDER NOTE - PHYSICAL EXAMINATION
MSK: Paronychia w/ redness, swelling, no induration, no streaking. Rest of MSK exam within normal limits.

## 2018-11-27 NOTE — ED PROVIDER NOTE - PROGRESS NOTE DETAILS
Paronychia drained. Feeling much better. Instructed to do warm soaks and PMD follow up in 2-3 days. Pt is well appearing walking with steady gait, stable for discharge and follow up without fail with medical doctor. I had a detailed discussion with the patient and/or guardian regarding the historical points, exam findings, and any diagnostic results supporting the discharge diagnosis. Pt educated on care and need for follow up. Strict return instructions and red flag signs and symptoms discussed with patient. Questions answered. Pt shows understanding of discharge information and agrees to follow.

## 2018-11-27 NOTE — ED PROVIDER NOTE - OBJECTIVE STATEMENT
59 y/o F pt w/ no hx presents c/o R 3rd pain, redness, swelling, pulsating sensation x 4 days. Pain is constant, worsening. Denies fever, chills, streaking, or any other complaints. NKDA.

## 2018-11-27 NOTE — ED PROVIDER NOTE - ATTENDING CONTRIBUTION TO CARE
I conducted a face-to-face interaction with patient and I agree with NP documentation and plan.  Pt presents w/R 3rd finger pain/redness/swelling  Exam: R 3rd finger paronychia  A/P:  I&D of R 3rd finger paronychia

## 2019-01-16 ENCOUNTER — APPOINTMENT (OUTPATIENT)
Dept: OTHER | Facility: CLINIC | Age: 59
End: 2019-01-16

## 2019-03-05 ENCOUNTER — FORM ENCOUNTER (OUTPATIENT)
Age: 59
End: 2019-03-05

## 2019-03-06 ENCOUNTER — APPOINTMENT (OUTPATIENT)
Age: 59
End: 2019-03-06
Payer: COMMERCIAL

## 2019-03-06 ENCOUNTER — APPOINTMENT (OUTPATIENT)
Dept: OTHER | Facility: CLINIC | Age: 59
End: 2019-03-06
Payer: COMMERCIAL

## 2019-03-06 VITALS
WEIGHT: 129 LBS | HEIGHT: 62 IN | OXYGEN SATURATION: 100 % | BODY MASS INDEX: 23.74 KG/M2 | HEART RATE: 66 BPM | TEMPERATURE: 98.2 F | SYSTOLIC BLOOD PRESSURE: 99 MMHG | RESPIRATION RATE: 18 BRPM | DIASTOLIC BLOOD PRESSURE: 62 MMHG

## 2019-03-06 PROCEDURE — 96150: CPT

## 2019-03-06 PROCEDURE — 94010 BREATHING CAPACITY TEST: CPT

## 2019-03-06 PROCEDURE — 99214 OFFICE O/P EST MOD 30 MIN: CPT | Mod: 25

## 2019-03-06 PROCEDURE — 99396 PREV VISIT EST AGE 40-64: CPT | Mod: 25

## 2019-03-06 RX ORDER — RANITIDINE 150 MG/1
150 TABLET ORAL
Qty: 30 | Refills: 6 | Status: COMPLETED | COMMUNITY
Start: 2018-02-07 | End: 2019-03-06

## 2019-03-06 RX ORDER — FEXOFENADINE HYDROCHLORIDE AND PSEUDOEPHEDRINE HYDROCHLORIDE 180; 240 MG/1; MG/1
180-240 TABLET, FILM COATED, EXTENDED RELEASE ORAL DAILY
Qty: 30 | Refills: 6 | Status: COMPLETED | COMMUNITY
Start: 2017-11-01 | End: 2019-03-06

## 2019-03-06 NOTE — ASSESSMENT
[FreeTextEntry1] : teresita today: fvc 2.79, 91%; fev1 2.31, 95%; ratio 83, 104%; flow volume normal. loss of 50 ml/yr fev1 since 2014 (fev1 2014: 2.56)\par a. overall stable form her wtc conditions. multiple complaints on a depressive mood background.\par p. meds renewed, will change allergra to zyrtec, see if this helps better. will change ranitidine to famotidine as epr program. given meds x 3 mo, pt to travel to her country in a couple of months. rtc prn.

## 2019-03-06 NOTE — HEALTH RISK ASSESSMENT
[Patient reported colonoscopy was normal] : Patient reported colonoscopy was normal [ColonoscopyDate] : 2/2019

## 2019-03-06 NOTE — DISCUSSION/SUMMARY
[Patient seen for WTC Monitoring ___] : Patient was seen for WTC monitoring [unfilled] [Please See Note in Chart and Documentation in Trial DB] : Please see note in chart and documentation in Trial DB. [FreeTextEntry3] : certified for asthma, rhinitis and gerd\par s. patient reports generalized musculoskeletal pain, reports raynaud in her right 3rd finger, has been referred to a hand surgeon. continues with generalized itchiness in her skin, Allegra did not help. was seen by a specialist. her asthma is stable overall. reports headache as well. no cough, no phlegm, no nasal secretions. is on meds for depression. has family problems as well. uses ventolin prn, not daily. \par continues with anemia, is reprotedly getting iv iron. had endoscopy and colonoscopy both normal reportedly. was hospitalzed a couple of months ago for her finger condition. \par she's planning to move to her country in the middle of the year. patient is not working. \par Constitutional: alert, in no acute distress, well nourished and well developed. \par Eyes: the sclera and conjunctiva were normal, pupils were equal in size, round, and reactive to light and extraocular movements were intact. \par ENT: the ears and nose were normal in appearance. Tubrinates symmetrical, occluding the lumens in a good 50% or so, not puffy, no secretions, some crusting. no tender at sinues. orpharynx overall clear. \par Neck: the appearance of the neck was normal, the neck was supple, no neck mass was observed and the thyroid was not enlarged . there was no jugular-venous distention. \par Pulmonary: no respiratory distress, normal respiratory rhythm and effort, no accessory muscle use, lungs were clear to auscultation bilaterally and palpation of the chest revealed no abnormalities . tender to palpation at left upper chest area, no skin rash, normal auscultation. \par Heart: the apical impulse was normal, heart rate was normal and rhythm regular, normal S1 and S2 and no gallops . systolic murmur at all foci, more artic, ii/vi. \par Vascular:. there was no peripheral edema. \par Abdomen: normal bowel sounds, soft, non-tender, no hepato-splenomegaly and no abdominal mass palpated. \par Lymphatics: The posterior cervical, anterior cervical, supraclavicular and axillary nodes were non-tender and normal size. \par a and p. documentation completed. \par

## 2019-03-06 NOTE — HISTORY OF PRESENT ILLNESS
[FreeTextEntry1] : certified for asthma, rhinitis and gerd\par s. patient reports generalized musculoskeletal pain, reports raynaud in her right 3rd finger, has been referred to a hand surgeon. continues with generalized itchiness in her skin, Allegra did not help. was seen by a specialist. her asthma is stable overall. reports headache as well. no cough, no phlegm, no nasal secretions. is on meds for depression. has family problems as well. uses ventolin prn, not daily. \par continues with anemia, is reprotedly getting iv iron. had endoscopy and colonoscopy both normal reportedly. was hospitalzed a couple of months ago for her finger condition. \par she's planning to move to her country in the middle of the year. patient is not working.

## 2019-03-06 NOTE — PHYSICAL EXAM
[General Appearance - Alert] : alert [General Appearance - In No Acute Distress] : in no acute distress [General Appearance - Well Nourished] : well nourished [General Appearance - Well Developed] : well developed [Sclera] : the sclera and conjunctiva were normal [PERRL With Normal Accommodation] : pupils were equal in size, round, and reactive to light [Extraocular Movements] : extraocular movements were intact [Outer Ear] : the ears and nose were normal in appearance [Neck Appearance] : the appearance of the neck was normal [Neck Cervical Mass (___cm)] : no neck mass was observed [Jugular Venous Distention Increased] : there was no jugular-venous distention [Thyroid Diffuse Enlargement] : the thyroid was not enlarged [Respiration, Rhythm And Depth] : normal respiratory rhythm and effort [Exaggerated Use Of Accessory Muscles For Inspiration] : no accessory muscle use [Auscultation Breath Sounds / Voice Sounds] : lungs were clear to auscultation bilaterally [Chest Palpation] : palpation of the chest revealed no abnormalities [Apical Impulse] : the apical impulse was normal [Heart Rate And Rhythm] : heart rate was normal and rhythm regular [Heart Sounds] : normal S1 and S2 [Heart Sounds Gallop] : no gallops [FreeTextEntry1] : systolic murmur at all foci, more artic, ii/vi [Edema] : there was no peripheral edema [Bowel Sounds] : normal bowel sounds [Abdomen Soft] : soft [Abdomen Tenderness] : non-tender [] : no hepato-splenomegaly [Abdomen Mass (___ Cm)] : no abdominal mass palpated [Cervical Lymph Nodes Enlarged Posterior Bilaterally] : posterior cervical [Cervical Lymph Nodes Enlarged Anterior Bilaterally] : anterior cervical [Supraclavicular Lymph Nodes Enlarged Bilaterally] : supraclavicular [Axillary Lymph Nodes Enlarged Bilaterally] : axillary

## 2019-03-07 LAB
ALBUMIN SERPL ELPH-MCNC: 4.2 G/DL
ALP BLD-CCNC: 74 U/L
ALT SERPL-CCNC: 21 U/L
ANION GAP SERPL CALC-SCNC: 11 MMOL/L
APPEARANCE: CLEAR
AST SERPL-CCNC: 30 U/L
BASOPHILS # BLD AUTO: 0.03 K/UL
BASOPHILS NFR BLD AUTO: 0.4 %
BILIRUB SERPL-MCNC: 0.2 MG/DL
BILIRUBIN URINE: NEGATIVE
BLOOD URINE: NEGATIVE
BUN SERPL-MCNC: 22 MG/DL
CALCIUM SERPL-MCNC: 8.9 MG/DL
CHLORIDE SERPL-SCNC: 106 MMOL/L
CHOLEST SERPL-MCNC: 166 MG/DL
CHOLEST/HDLC SERPL: 1.9 RATIO
CO2 SERPL-SCNC: 25 MMOL/L
COLOR: YELLOW
CREAT SERPL-MCNC: 0.82 MG/DL
EOSINOPHIL # BLD AUTO: 0.17 K/UL
EOSINOPHIL NFR BLD AUTO: 2.4 %
GLUCOSE QUALITATIVE U: NEGATIVE
GLUCOSE SERPL-MCNC: 94 MG/DL
HCT VFR BLD CALC: 28.5 %
HDLC SERPL-MCNC: 88 MG/DL
HGB BLD-MCNC: 8.3 G/DL
IMM GRANULOCYTES NFR BLD AUTO: 0.1 %
KETONES URINE: NEGATIVE
LDLC SERPL CALC-MCNC: 62 MG/DL
LEUKOCYTE ESTERASE URINE: NEGATIVE
LYMPHOCYTES # BLD AUTO: 1.31 K/UL
LYMPHOCYTES NFR BLD AUTO: 18.3 %
MAN DIFF?: NORMAL
MCHC RBC-ENTMCNC: 26.3 PG
MCHC RBC-ENTMCNC: 29.1 GM/DL
MCV RBC AUTO: 90.2 FL
MONOCYTES # BLD AUTO: 0.52 K/UL
MONOCYTES NFR BLD AUTO: 7.3 %
NEUTROPHILS # BLD AUTO: 5.12 K/UL
NEUTROPHILS NFR BLD AUTO: 71.5 %
NITRITE URINE: NEGATIVE
PH URINE: 6
PLATELET # BLD AUTO: 261 K/UL
POTASSIUM SERPL-SCNC: 4.8 MMOL/L
PROT SERPL-MCNC: 6.2 G/DL
PROTEIN URINE: NORMAL
RBC # BLD: 3.16 M/UL
RBC # FLD: 13.1 %
SODIUM SERPL-SCNC: 142 MMOL/L
SPECIFIC GRAVITY URINE: 1.03
TRIGL SERPL-MCNC: 78 MG/DL
UROBILINOGEN URINE: NORMAL
WBC # FLD AUTO: 7.16 K/UL

## 2019-04-02 ENCOUNTER — APPOINTMENT (OUTPATIENT)
Dept: PSYCHIATRY | Facility: CLINIC | Age: 59
End: 2019-04-02

## 2019-04-18 ENCOUNTER — APPOINTMENT (OUTPATIENT)
Dept: PSYCHIATRY | Facility: CLINIC | Age: 59
End: 2019-04-18
Payer: COMMERCIAL

## 2019-04-18 DIAGNOSIS — F32.9 ANXIETY DISORDER, UNSPECIFIED: ICD-10-CM

## 2019-04-18 DIAGNOSIS — F41.9 ANXIETY DISORDER, UNSPECIFIED: ICD-10-CM

## 2019-04-18 DIAGNOSIS — G47.00 INSOMNIA, UNSPECIFIED: ICD-10-CM

## 2019-04-18 PROCEDURE — 99205 OFFICE O/P NEW HI 60 MIN: CPT

## 2019-04-18 RX ORDER — VORTIOXETINE 10 MG/1
10 TABLET, FILM COATED ORAL
Qty: 1 | Refills: 0 | Status: ACTIVE | COMMUNITY
Start: 2019-04-18

## 2019-06-18 ENCOUNTER — APPOINTMENT (OUTPATIENT)
Dept: ENDOCRINOLOGY | Facility: CLINIC | Age: 59
End: 2019-06-18

## 2019-07-03 ENCOUNTER — RX RENEWAL (OUTPATIENT)
Age: 59
End: 2019-07-03

## 2019-11-25 ENCOUNTER — RX RENEWAL (OUTPATIENT)
Age: 59
End: 2019-11-25

## 2019-12-09 NOTE — ED PROVIDER NOTE - DR. NAME
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 Abx currently switched from cefepime to meropenem per ID. Brennen Ceja

## 2021-04-15 NOTE — DISCHARGE NOTE ADULT - THE PATIENT HAS
[FreeTextEntry1] : No pessary inserted back.  She would like to go without pessary at this time.\par She is aware the prolapse will continue to descend down and may protrude more than what it is.\par She may reduce it with her finger.\par Follow up in 1 month or sooner to check prolapse and PVR.  IF pt have any problems/concern to call office.  PT aware and agrees.
no difficulties

## 2021-05-19 ENCOUNTER — APPOINTMENT (OUTPATIENT)
Dept: OTHER | Facility: CLINIC | Age: 61
End: 2021-05-19
Payer: COMMERCIAL

## 2021-05-19 ENCOUNTER — LABORATORY RESULT (OUTPATIENT)
Age: 61
End: 2021-05-19

## 2021-05-19 VITALS
OXYGEN SATURATION: 100 % | HEIGHT: 62 IN | WEIGHT: 148 LBS | RESPIRATION RATE: 18 BRPM | SYSTOLIC BLOOD PRESSURE: 94 MMHG | BODY MASS INDEX: 27.23 KG/M2 | HEART RATE: 67 BPM | DIASTOLIC BLOOD PRESSURE: 58 MMHG

## 2021-05-19 PROCEDURE — 99396 PREV VISIT EST AGE 40-64: CPT | Mod: 25

## 2021-05-19 PROCEDURE — 99214 OFFICE O/P EST MOD 30 MIN: CPT | Mod: 25

## 2021-05-19 NOTE — ASSESSMENT
[FreeTextEntry1] : med records from [facility] reviewed, hospitalized nov 2019 due to right subarachnoid occipital hemorrhage with normal brain angio; skull ct dec 219: nomral; angio mri of brain dec 2019: no thrombosis, loss of volume brain parenchyma, focal right occipitial subarachnoid hemorrhage, hyperdense image right frontal lobe. \par a. overall stable from her wtc condtions, which are permanent and active. s/p brain hemorrhage. upper back pain most probably muscular. \par p. meds renewed. recommended to seek neuro eval asap. will get abdominal u/s as pt reports occasional pain in her right lower quadrant, her mother had pancreatic cancer. will see her in some 4 mo. will get cxt today.

## 2021-05-19 NOTE — DISCUSSION/SUMMARY
[Patient seen for WTC Monitoring ___] : Patient was seen for WTC monitoring [unfilled] [Please See Note in Chart and Documentation in Trial DB] : Please see note in chart and documentation in Trial DB. [FreeTextEntry3] : History of Present Illness\par certified for asthma, rhinitis and gerd\par arrives some 30 min earlier\par last seen 2019\par s. [place] country [place] there she presented a cerebral hemorrhage, was hospitalized for a couple of weeks in november 2019. episode improve with no sequela. [place] return to the [place] has noticed new onset nasal symptoms with secretions and itching of the eyes. a few days ago has developed left upper back pain radiated to neck area, similar to what she had in the past. a few days ago presented new episode of sudden dizziness and headache. \par has had no meds. \par was seen by psych while in [facility], given some meds but resulted in some secondary effects. \par received covid vaccine.\par patient is not working, she is disabled due to an accident on the job. \par Physical Exam\par Constitutional: alert, in no acute distress, well nourished and well developed. \par Eyes: the sclera and conjunctiva were normal, pupils were equal in size, round, and reactive to light and extraocular movements were intact. \par ENT: the ears and nose were normal in appearance. No tender at sinuses. \par Neck: the appearance of the neck was normal, the neck was supple, no neck mass was observed and the thyroid was not enlarged . there was no jugular-venous distention. \par Pulmonary: no respiratory distress, normal respiratory rhythm and effort, no accessory muscle use, lungs were clear to auscultation bilaterally and palpation of the chest revealed no abnormalities . tender to palpation at left upper back with paraspinal trigger point. \par Heart: the apical impulse was normal, heart rate was normal and rhythm regular, normal S1 and S2 and no gallops . systolic murmur at all foci, more artic, ii/vi. \par Vascular:. there was no peripheral edema. \par Abdomen: normal bowel sounds, soft, non-tender, no hepato-splenomegaly and no abdominal mass palpated. \par Lymphatics: The posterior cervical, anterior cervical, supraclavicular and axillary nodes were non-tender and normal size.\par a and p. documentation completed.

## 2021-05-19 NOTE — ASSESSMENT
[FreeTextEntry1] : med records from [facility] reviewed, hospitalized nov 2019 due to right subarachnoid occipital hemorrhage with normal brain angio; skull ct dec 219: nomral; angio mri of brain dec 2019: no thrombosis, loss of volume brain parenchyma, focal right occipitial subarachnoid hemorrhage, hyperdense image right frontal lobe. \par a. overall stable from her wtc condtions, which are permanent and active. s/p brain hemorrhage. upper back pain most probably muscular. \par p. meds renewed. recommended to seek neuro eval asap. will get abdominal u/s as pt reports occasional pain in her right lower quadrant, her mother had pancreatic cancer. will see her in some 4 mo. will get cxt today.

## 2021-05-19 NOTE — HISTORY OF PRESENT ILLNESS
[FreeTextEntry1] : certified for asthma, rhinitis and gerd\arnaldo arrives some 30 min earlier\par last seen 2019\par s. [place] country [place] there she presented a cerebral hemorrhage, was hospitalized for a couple of weeks in november 2019. episode improve with no sequela. [place] return to the [place] has noticed new onset nasal symptoms with secretions and itching of the eyes. a few days ago has developed left upper back pain radiated to neck area, similar to what she had in the past. a few days ago presented new episode of sudden dizziness and headache. \arnaldo has had no meds. \arnaldo was seen by psych while in [facility], given some meds but resulted in some secondary effects. \arnaldo received covid vaccine.\par patient is not working, she is disabled due to an accident on the job.

## 2021-05-19 NOTE — DISCUSSION/SUMMARY
[Patient seen for WTC Monitoring ___] : Patient was seen for WTC monitoring [unfilled] [Please See Note in Chart and Documentation in Trial DB] : Please see note in chart and documentation in Trial DB. [FreeTextEntry3] : History of Present Illness\par certified for asthma, rhinitis and gerd\par arrives some 30 min earlier\par last seen 2019\par s. patient was [place] country [place] 2 ys[place] there she presented a cerebral hemorrhage, was hospitalized for a couple of weeks in november 2019. episode improve with no sequela. upon her return to the [place] has noticed new onset nasal symptoms with secretions and itching of the eyes. a few days ago has developed left upper back pain radiated to neck area, similar to what she had in the past. a few days ago presented new episode of sudden dizziness and headache. \par has had no meds. \par was seen by psych while in [facility], given some meds but resulted in some secondary effects. \par received covid vaccine.\par patient is not working, she is disabled due to an accident on the job. \par Physical Exam\par Constitutional: alert, in no acute distress, well nourished and well developed. \par Eyes: the sclera and conjunctiva were normal, pupils were equal in size, round, and reactive to light and extraocular movements were intact. \par ENT: the ears and nose were normal in appearance. No tender at sinuses. \par Neck: the appearance of the neck was normal, the neck was supple, no neck mass was observed and the thyroid was not enlarged . there was no jugular-venous distention. \par Pulmonary: no respiratory distress, normal respiratory rhythm and effort, no accessory muscle use, lungs were clear to auscultation bilaterally and palpation of the chest revealed no abnormalities . tender to palpation at left upper back with paraspinal trigger point. \par Heart: the apical impulse was normal, heart rate was normal and rhythm regular, normal S1 and S2 and no gallops . systolic murmur at all foci, more artic, ii/vi. \par Vascular:. there was no peripheral edema. \par Abdomen: normal bowel sounds, soft, non-tender, no hepato-splenomegaly and no abdominal mass palpated. \par Lymphatics: The posterior cervical, anterior cervical, supraclavicular and axillary nodes were non-tender and normal size.\par a and p. documentation completed.

## 2021-05-19 NOTE — HISTORY OF PRESENT ILLNESS
[FreeTextEntry1] : certified for asthma, rhinitis and gerd\arnaldo arrives some 30 min earlier\par last seen 2019\par s. [place] country [place] there she presented a cerebral hemorrhage, was hospitalized for a couple of weeks in november 2019. episode improve with no sequela. upon her return to the [place] has noticed new onset nasal symptoms with secretions and itching of the eyes. a few days ago has developed left upper back pain radiated to neck area, similar to what she had in the past. a few days ago presented new episode of sudden dizziness and headache. \arnaldo has had no meds. \arnaldo was seen by psych while in [facility], given some meds but resulted in some secondary effects. \arnaldo received covid vaccine.\par patient is not working, she is disabled due to an accident on the job.

## 2021-05-20 LAB
ALBUMIN SERPL ELPH-MCNC: 4.4 G/DL
ALP BLD-CCNC: 86 U/L
ALT SERPL-CCNC: 14 U/L
ANION GAP SERPL CALC-SCNC: 12 MMOL/L
APPEARANCE: CLEAR
AST SERPL-CCNC: 28 U/L
BASOPHILS # BLD AUTO: 0.03 K/UL
BASOPHILS NFR BLD AUTO: 0.5 %
BILIRUB SERPL-MCNC: 0.3 MG/DL
BILIRUBIN URINE: NEGATIVE
BLOOD URINE: NEGATIVE
BUN SERPL-MCNC: 21 MG/DL
CALCIUM SERPL-MCNC: 9.6 MG/DL
CHLORIDE SERPL-SCNC: 110 MMOL/L
CHOLEST SERPL-MCNC: 168 MG/DL
CO2 SERPL-SCNC: 21 MMOL/L
COLOR: YELLOW
CREAT SERPL-MCNC: 0.9 MG/DL
EOSINOPHIL # BLD AUTO: 0.29 K/UL
EOSINOPHIL NFR BLD AUTO: 4.5 %
GLUCOSE QUALITATIVE U: NEGATIVE
GLUCOSE SERPL-MCNC: 76 MG/DL
HCT VFR BLD CALC: 35.8 %
HDLC SERPL-MCNC: 60 MG/DL
HGB BLD-MCNC: 10.9 G/DL
IMM GRANULOCYTES NFR BLD AUTO: 0.3 %
KETONES URINE: NEGATIVE
LDLC SERPL CALC-MCNC: 81 MG/DL
LEUKOCYTE ESTERASE URINE: NEGATIVE
LYMPHOCYTES # BLD AUTO: 1.46 K/UL
LYMPHOCYTES NFR BLD AUTO: 22.8 %
MAN DIFF?: NORMAL
MCHC RBC-ENTMCNC: 30.1 PG
MCHC RBC-ENTMCNC: 30.4 GM/DL
MCV RBC AUTO: 98.9 FL
MONOCYTES # BLD AUTO: 0.58 K/UL
MONOCYTES NFR BLD AUTO: 9.1 %
NEUTROPHILS # BLD AUTO: 4.01 K/UL
NEUTROPHILS NFR BLD AUTO: 62.8 %
NITRITE URINE: NEGATIVE
NONHDLC SERPL-MCNC: 108 MG/DL
PH URINE: 6
PLATELET # BLD AUTO: 256 K/UL
POTASSIUM SERPL-SCNC: 4.9 MMOL/L
PROT SERPL-MCNC: 7 G/DL
PROTEIN URINE: ABNORMAL
RBC # BLD: 3.62 M/UL
RBC # FLD: 14.6 %
SODIUM SERPL-SCNC: 143 MMOL/L
SPECIFIC GRAVITY URINE: 1.03
TRIGL SERPL-MCNC: 136 MG/DL
UROBILINOGEN URINE: ABNORMAL
WBC # FLD AUTO: 6.39 K/UL

## 2021-05-26 ENCOUNTER — NON-APPOINTMENT (OUTPATIENT)
Age: 61
End: 2021-05-26

## 2021-06-02 ENCOUNTER — NON-APPOINTMENT (OUTPATIENT)
Age: 61
End: 2021-06-02

## 2021-09-27 ENCOUNTER — NON-APPOINTMENT (OUTPATIENT)
Age: 61
End: 2021-09-27

## 2021-10-20 ENCOUNTER — APPOINTMENT (OUTPATIENT)
Dept: OTHER | Facility: CLINIC | Age: 61
End: 2021-10-20
Payer: COMMERCIAL

## 2021-10-20 VITALS
WEIGHT: 148 LBS | SYSTOLIC BLOOD PRESSURE: 97 MMHG | TEMPERATURE: 98 F | HEIGHT: 62 IN | HEART RATE: 59 BPM | DIASTOLIC BLOOD PRESSURE: 62 MMHG | OXYGEN SATURATION: 100 % | BODY MASS INDEX: 27.23 KG/M2 | RESPIRATION RATE: 18 BRPM

## 2021-10-20 DIAGNOSIS — F43.10 POST-TRAUMATIC STRESS DISORDER, UNSPECIFIED: ICD-10-CM

## 2021-10-20 PROCEDURE — 99214 OFFICE O/P EST MOD 30 MIN: CPT

## 2021-10-20 NOTE — HISTORY OF PRESENT ILLNESS
[FreeTextEntry1] : certified for asthma, rhinitis and gerd\par visit conducted in Japanese\par s. pt has been overall stable from her wt conditions. using inhaler only occasinally. gerd is stable. has not seen neuro yet due to insurance problems. reports occasional dizziness. saw pmd, is to get iron injections. \par received covid vaccine.\par patient is not working, she is disabled due to an accident on the job.

## 2021-10-20 NOTE — PHYSICAL EXAM
[General Appearance - Alert] : alert [General Appearance - In No Acute Distress] : in no acute distress [General Appearance - Well Nourished] : well nourished [General Appearance - Well Developed] : well developed [Sclera] : the sclera and conjunctiva were normal [PERRL With Normal Accommodation] : pupils were equal in size, round, and reactive to light [Extraocular Movements] : extraocular movements were intact [Outer Ear] : the ears and nose were normal in appearance [Neck Appearance] : the appearance of the neck was normal [Neck Cervical Mass (___cm)] : no neck mass was observed [Jugular Venous Distention Increased] : there was no jugular-venous distention [Thyroid Diffuse Enlargement] : the thyroid was not enlarged [Respiration, Rhythm And Depth] : normal respiratory rhythm and effort [Exaggerated Use Of Accessory Muscles For Inspiration] : no accessory muscle use [Auscultation Breath Sounds / Voice Sounds] : lungs were clear to auscultation bilaterally [Chest Palpation] : palpation of the chest revealed no abnormalities [Apical Impulse] : the apical impulse was normal [Heart Rate And Rhythm] : heart rate was normal and rhythm regular [Heart Sounds] : normal S1 and S2 [Heart Sounds Gallop] : no gallops [Edema] : there was no peripheral edema [Bowel Sounds] : normal bowel sounds [Abdomen Soft] : soft [Abdomen Tenderness] : non-tender [] : no hepato-splenomegaly [Abdomen Mass (___ Cm)] : no abdominal mass palpated [FreeTextEntry1] : aortic murmur [Cervical Lymph Nodes Enlarged Posterior Bilaterally] : posterior cervical [Cervical Lymph Nodes Enlarged Anterior Bilaterally] : anterior cervical [Supraclavicular Lymph Nodes Enlarged Bilaterally] : supraclavicular [Axillary Lymph Nodes Enlarged Bilaterally] : axillary

## 2021-10-20 NOTE — ASSESSMENT
[FreeTextEntry1] : lbs and results reviewed with pt\par a. overall stable form her wtc conditions.\par p. meds renewed, unchanged. will add zolpidem as per psych authorization. given phone number of [place] to request follow up. wll see herberth in some 4 mo. pending neuro.

## 2021-11-18 ENCOUNTER — NON-APPOINTMENT (OUTPATIENT)
Age: 61
End: 2021-11-18

## 2022-02-09 ENCOUNTER — APPOINTMENT (OUTPATIENT)
Dept: OTHER | Facility: CLINIC | Age: 62
End: 2022-02-09
Payer: COMMERCIAL

## 2022-02-09 VITALS
BODY MASS INDEX: 24.84 KG/M2 | WEIGHT: 135 LBS | HEIGHT: 62 IN | RESPIRATION RATE: 18 BRPM | HEART RATE: 63 BPM | OXYGEN SATURATION: 100 % | SYSTOLIC BLOOD PRESSURE: 104 MMHG | DIASTOLIC BLOOD PRESSURE: 67 MMHG

## 2022-02-09 DIAGNOSIS — Z23 ENCOUNTER FOR IMMUNIZATION: ICD-10-CM

## 2022-02-09 PROCEDURE — G0008: CPT

## 2022-02-09 PROCEDURE — 99214 OFFICE O/P EST MOD 30 MIN: CPT | Mod: 25

## 2022-02-09 PROCEDURE — 90686 IIV4 VACC NO PRSV 0.5 ML IM: CPT

## 2022-02-09 NOTE — ASSESSMENT
[FreeTextEntry1] : a. overall stable form her wtc conditions.\par p. meds renewed, unchanged. wll see herberth in some 4-5 mo. continue pmd.

## 2022-02-09 NOTE — PHYSICAL EXAM
[General Appearance - Alert] : alert [General Appearance - In No Acute Distress] : in no acute distress [General Appearance - Well Nourished] : well nourished [General Appearance - Well Developed] : well developed [Sclera] : the sclera and conjunctiva were normal [PERRL With Normal Accommodation] : pupils were equal in size, round, and reactive to light [Extraocular Movements] : extraocular movements were intact [Outer Ear] : the ears and nose were normal in appearance [Neck Appearance] : the appearance of the neck was normal [Neck Cervical Mass (___cm)] : no neck mass was observed [Jugular Venous Distention Increased] : there was no jugular-venous distention [Thyroid Diffuse Enlargement] : the thyroid was not enlarged [Respiration, Rhythm And Depth] : normal respiratory rhythm and effort [Exaggerated Use Of Accessory Muscles For Inspiration] : no accessory muscle use [Chest Palpation] : palpation of the chest revealed no abnormalities [Apical Impulse] : the apical impulse was normal [Heart Rate And Rhythm] : heart rate was normal and rhythm regular [Heart Sounds] : normal S1 and S2 [Heart Sounds Gallop] : no gallops [Edema] : there was no peripheral edema [Bowel Sounds] : normal bowel sounds [Abdomen Soft] : soft [Abdomen Tenderness] : non-tender [] : no hepato-splenomegaly [Abdomen Mass (___ Cm)] : no abdominal mass palpated [FreeTextEntry1] : aortic murmur [Cervical Lymph Nodes Enlarged Posterior Bilaterally] : posterior cervical [Cervical Lymph Nodes Enlarged Anterior Bilaterally] : anterior cervical [Supraclavicular Lymph Nodes Enlarged Bilaterally] : supraclavicular [Axillary Lymph Nodes Enlarged Bilaterally] : axillary

## 2022-02-09 NOTE — HISTORY OF PRESENT ILLNESS
[FreeTextEntry1] : certified for asthma, rhinitis and gerd\par visit conducted in Portuguese\par s. pt has been overall stable from her [facility] conditions, except reporting some clear nasal drip over the past few days. she has no nasal spray at this time. \par has not need to use her inhaler. gerd is stable. saw neuro for her history of intracranial bleeding. has pmd. \par received covid vaccine.\par patient is not working, she is disabled due to an accident on the job.

## 2022-04-14 ENCOUNTER — RX RENEWAL (OUTPATIENT)
Age: 62
End: 2022-04-14

## 2022-04-18 ENCOUNTER — RX RENEWAL (OUTPATIENT)
Age: 62
End: 2022-04-18

## 2022-06-22 ENCOUNTER — RX RENEWAL (OUTPATIENT)
Age: 62
End: 2022-06-22

## 2022-06-22 ENCOUNTER — APPOINTMENT (OUTPATIENT)
Dept: OTHER | Facility: CLINIC | Age: 62
End: 2022-06-22

## 2022-07-20 ENCOUNTER — RX RENEWAL (OUTPATIENT)
Age: 62
End: 2022-07-20

## 2022-09-09 ENCOUNTER — EMERGENCY (EMERGENCY)
Facility: HOSPITAL | Age: 62
LOS: 1 days | Discharge: HOME | End: 2022-09-09
Attending: EMERGENCY MEDICINE
Payer: COMMERCIAL

## 2022-09-09 VITALS
RESPIRATION RATE: 18 BRPM | HEART RATE: 62 BPM | OXYGEN SATURATION: 96 % | DIASTOLIC BLOOD PRESSURE: 77 MMHG | TEMPERATURE: 99 F | SYSTOLIC BLOOD PRESSURE: 131 MMHG

## 2022-09-09 VITALS
HEART RATE: 62 BPM | WEIGHT: 139.99 LBS | OXYGEN SATURATION: 98 % | TEMPERATURE: 97 F | SYSTOLIC BLOOD PRESSURE: 122 MMHG | RESPIRATION RATE: 18 BRPM | DIASTOLIC BLOOD PRESSURE: 73 MMHG | HEIGHT: 62 IN

## 2022-09-09 DIAGNOSIS — Z98.89 OTHER SPECIFIED POSTPROCEDURAL STATES: Chronic | ICD-10-CM

## 2022-09-09 LAB
ALBUMIN SERPL ELPH-MCNC: 3.2 G/DL — LOW (ref 3.5–5)
ALP SERPL-CCNC: 72 U/L — SIGNIFICANT CHANGE UP (ref 40–120)
ALT FLD-CCNC: 24 U/L DA — SIGNIFICANT CHANGE UP (ref 10–60)
ANION GAP SERPL CALC-SCNC: 6 MMOL/L — SIGNIFICANT CHANGE UP (ref 5–17)
APPEARANCE UR: CLEAR — SIGNIFICANT CHANGE UP
APTT BLD: 30.3 SEC — SIGNIFICANT CHANGE UP (ref 27.5–35.5)
AST SERPL-CCNC: 22 U/L — SIGNIFICANT CHANGE UP (ref 10–40)
BASOPHILS # BLD AUTO: 0.02 K/UL — SIGNIFICANT CHANGE UP (ref 0–0.2)
BASOPHILS NFR BLD AUTO: 0.4 % — SIGNIFICANT CHANGE UP (ref 0–2)
BILIRUB SERPL-MCNC: 0.2 MG/DL — SIGNIFICANT CHANGE UP (ref 0.2–1.2)
BILIRUB UR-MCNC: NEGATIVE — SIGNIFICANT CHANGE UP
BLD GP AB SCN SERPL QL: SIGNIFICANT CHANGE UP
BUN SERPL-MCNC: 15 MG/DL — SIGNIFICANT CHANGE UP (ref 7–18)
CALCIUM SERPL-MCNC: 8.6 MG/DL — SIGNIFICANT CHANGE UP (ref 8.4–10.5)
CHLORIDE SERPL-SCNC: 113 MMOL/L — HIGH (ref 96–108)
CO2 SERPL-SCNC: 24 MMOL/L — SIGNIFICANT CHANGE UP (ref 22–31)
COLOR SPEC: YELLOW — SIGNIFICANT CHANGE UP
CREAT SERPL-MCNC: 0.77 MG/DL — SIGNIFICANT CHANGE UP (ref 0.5–1.3)
DIFF PNL FLD: NEGATIVE — SIGNIFICANT CHANGE UP
EGFR: 88 ML/MIN/1.73M2 — SIGNIFICANT CHANGE UP
EOSINOPHIL # BLD AUTO: 0.13 K/UL — SIGNIFICANT CHANGE UP (ref 0–0.5)
EOSINOPHIL NFR BLD AUTO: 2.7 % — SIGNIFICANT CHANGE UP (ref 0–6)
GLUCOSE SERPL-MCNC: 120 MG/DL — HIGH (ref 70–99)
GLUCOSE UR QL: NEGATIVE — SIGNIFICANT CHANGE UP
HCT VFR BLD CALC: 22.3 % — LOW (ref 34.5–45)
HGB BLD-MCNC: 6.6 G/DL — CRITICAL LOW (ref 11.5–15.5)
IMM GRANULOCYTES NFR BLD AUTO: 0.4 % — SIGNIFICANT CHANGE UP (ref 0–1.5)
INR BLD: 1.07 RATIO — SIGNIFICANT CHANGE UP (ref 0.88–1.16)
KETONES UR-MCNC: NEGATIVE — SIGNIFICANT CHANGE UP
LEUKOCYTE ESTERASE UR-ACNC: NEGATIVE — SIGNIFICANT CHANGE UP
LIDOCAIN IGE QN: 149 U/L — SIGNIFICANT CHANGE UP (ref 73–393)
LYMPHOCYTES # BLD AUTO: 1.07 K/UL — SIGNIFICANT CHANGE UP (ref 1–3.3)
LYMPHOCYTES # BLD AUTO: 22.1 % — SIGNIFICANT CHANGE UP (ref 13–44)
MCHC RBC-ENTMCNC: 28.3 PG — SIGNIFICANT CHANGE UP (ref 27–34)
MCHC RBC-ENTMCNC: 29.6 GM/DL — LOW (ref 32–36)
MCV RBC AUTO: 95.7 FL — SIGNIFICANT CHANGE UP (ref 80–100)
MONOCYTES # BLD AUTO: 0.52 K/UL — SIGNIFICANT CHANGE UP (ref 0–0.9)
MONOCYTES NFR BLD AUTO: 10.7 % — SIGNIFICANT CHANGE UP (ref 2–14)
NEUTROPHILS # BLD AUTO: 3.09 K/UL — SIGNIFICANT CHANGE UP (ref 1.8–7.4)
NEUTROPHILS NFR BLD AUTO: 63.7 % — SIGNIFICANT CHANGE UP (ref 43–77)
NITRITE UR-MCNC: NEGATIVE — SIGNIFICANT CHANGE UP
NRBC # BLD: 0 /100 WBCS — SIGNIFICANT CHANGE UP (ref 0–0)
PH UR: 6 — SIGNIFICANT CHANGE UP (ref 5–8)
PLATELET # BLD AUTO: 232 K/UL — SIGNIFICANT CHANGE UP (ref 150–400)
POTASSIUM SERPL-MCNC: 4.3 MMOL/L — SIGNIFICANT CHANGE UP (ref 3.5–5.3)
POTASSIUM SERPL-SCNC: 4.3 MMOL/L — SIGNIFICANT CHANGE UP (ref 3.5–5.3)
PROT SERPL-MCNC: 6.3 G/DL — SIGNIFICANT CHANGE UP (ref 6–8.3)
PROT UR-MCNC: NEGATIVE — SIGNIFICANT CHANGE UP
PROTHROM AB SERPL-ACNC: 12.7 SEC — SIGNIFICANT CHANGE UP (ref 10.5–13.4)
RBC # BLD: 2.33 M/UL — LOW (ref 3.8–5.2)
RBC # FLD: 16.4 % — HIGH (ref 10.3–14.5)
SODIUM SERPL-SCNC: 143 MMOL/L — SIGNIFICANT CHANGE UP (ref 135–145)
SP GR SPEC: 1 — LOW (ref 1.01–1.02)
TROPONIN I, HIGH SENSITIVITY RESULT: 7 NG/L — SIGNIFICANT CHANGE UP
UROBILINOGEN FLD QL: NEGATIVE — SIGNIFICANT CHANGE UP
WBC # BLD: 4.85 K/UL — SIGNIFICANT CHANGE UP (ref 3.8–10.5)
WBC # FLD AUTO: 4.85 K/UL — SIGNIFICANT CHANGE UP (ref 3.8–10.5)

## 2022-09-09 PROCEDURE — 36430 TRANSFUSION BLD/BLD COMPNT: CPT

## 2022-09-09 PROCEDURE — 83690 ASSAY OF LIPASE: CPT

## 2022-09-09 PROCEDURE — 93005 ELECTROCARDIOGRAM TRACING: CPT

## 2022-09-09 PROCEDURE — 86900 BLOOD TYPING SEROLOGIC ABO: CPT

## 2022-09-09 PROCEDURE — 36415 COLL VENOUS BLD VENIPUNCTURE: CPT

## 2022-09-09 PROCEDURE — 86850 RBC ANTIBODY SCREEN: CPT

## 2022-09-09 PROCEDURE — 86901 BLOOD TYPING SEROLOGIC RH(D): CPT

## 2022-09-09 PROCEDURE — 86923 COMPATIBILITY TEST ELECTRIC: CPT

## 2022-09-09 PROCEDURE — G1004: CPT

## 2022-09-09 PROCEDURE — 81003 URINALYSIS AUTO W/O SCOPE: CPT

## 2022-09-09 PROCEDURE — 85730 THROMBOPLASTIN TIME PARTIAL: CPT

## 2022-09-09 PROCEDURE — 85610 PROTHROMBIN TIME: CPT

## 2022-09-09 PROCEDURE — 99285 EMERGENCY DEPT VISIT HI MDM: CPT | Mod: 25

## 2022-09-09 PROCEDURE — P9040: CPT

## 2022-09-09 PROCEDURE — 84484 ASSAY OF TROPONIN QUANT: CPT

## 2022-09-09 PROCEDURE — 80053 COMPREHEN METABOLIC PANEL: CPT

## 2022-09-09 PROCEDURE — 85025 COMPLETE CBC W/AUTO DIFF WBC: CPT

## 2022-09-09 PROCEDURE — 74177 CT ABD & PELVIS W/CONTRAST: CPT | Mod: 26,MG

## 2022-09-09 PROCEDURE — 74177 CT ABD & PELVIS W/CONTRAST: CPT | Mod: MG

## 2022-09-09 PROCEDURE — 99285 EMERGENCY DEPT VISIT HI MDM: CPT

## 2022-09-09 NOTE — ED PROVIDER NOTE - CLINICAL SUMMARY MEDICAL DECISION MAKING FREE TEXT BOX
ATTG: : dizziness, hx anemia, concerns include but not limited cardiac / hema / infectious / inflammatory will check labs, c heck cardiac work up ekg, and re eval for dispo

## 2022-09-09 NOTE — ED PROVIDER NOTE - PATIENT PORTAL LINK FT
You can access the FollowMyHealth Patient Portal offered by [facility] by registering at the following website: [email or web address]. By joining [employer]’s FollowMyHealth portal, you will also be able to view your health information using other applications (apps) compatible with our system.

## 2022-09-09 NOTE — ED PROVIDER NOTE - NSFOLLOWUPINSTRUCTIONS_ED_ALL_ED_FT
Your diagnosis this visit was: Anemia, blood transfusion     From this ED visit you were prescribed: no new medications    You may be contacted by our Emergency Department Referrals Coordinator to set up your follow-up appointment within 24-48 hours of your discharge, Monday through Friday.   We recommend you follow up with: Dr. Martin on Tues for Iron infusion    Please return to the Emergency Department if you experience any of the following symptoms:  - Shortness of breath or trouble breathing  - Pressure, pain, or tightness in the chest  - Face drooping, arm weakness or speech difficulty,  - Persistent or severe vomiting  - Head injury or loss of consciousness  - Nonstop bleeding or an open wound

## 2022-09-09 NOTE — ED PROVIDER NOTE - OBJECTIVE STATEMENT
63 y/o f with pmhx anemia, s/p gastric bypass years ago, presents for dizziness. Began approx 8 days ago, difficulty walking because room is spinning and feels lightheaded. improves at rest. no weakness or numbness. no incontinence of bowel or bladder. no fever or chills. no urinary symptoms. States she feels like this when she has low hgb and needs transfusion. was supposed to get transfusion and ct at [facility] onc Dr. Abhishek Martin but not able to bc of insurance coverage. She went to see a new pmd this week Dr. taryn Silva for new abd pain on the left side she has been experiencing. She denies any hematuria, no rectal bleeding, no easy bruising.

## 2022-09-09 NOTE — ED PROVIDER NOTE - PROGRESS NOTE DETAILS
ATTG: :  spoke with Dr. Jones covering for Dr. Martin, agree with plan to dc home after transfuion and now approved for FE infusion on Tues. can follow up as outpt. patient signed out to me by Dr. Hendricks pending blood transfusion completion and CT scan. no emergent findings on CT and transfusion complete. stable for discharge. Amor Rangel

## 2022-09-09 NOTE — ED ADULT NURSE NOTE - OBJECTIVE STATEMENT
pt is here for dizziness.  pt stated that dizziness since today, denied chest pain or sob, denied headache or neck pain,

## 2022-09-09 NOTE — ED PROVIDER NOTE - PHYSICAL EXAMINATION
Gen.  no acute distress  HEENT:  pupils errl, eomi. no jaundice. no scleral icterus, no nystagmus  Lungs:  b/l bs  CVS: S1S2   Abd;  soft no tender no distention  Ext: no edema no erythema no calf tend  Neuro: aaox3 no focal deficits  MSK: strength 5/5 b/l upper and lower ext

## 2022-09-21 ENCOUNTER — APPOINTMENT (OUTPATIENT)
Dept: OTHER | Facility: CLINIC | Age: 62
End: 2022-09-21

## 2022-09-28 ENCOUNTER — APPOINTMENT (OUTPATIENT)
Dept: OTHER | Facility: CLINIC | Age: 62
End: 2022-09-28

## 2022-09-28 VITALS
RESPIRATION RATE: 16 BRPM | OXYGEN SATURATION: 99 % | HEIGHT: 62 IN | BODY MASS INDEX: 24.84 KG/M2 | WEIGHT: 135 LBS | TEMPERATURE: 98.6 F | SYSTOLIC BLOOD PRESSURE: 90 MMHG | DIASTOLIC BLOOD PRESSURE: 54 MMHG | HEART RATE: 60 BPM

## 2022-09-28 PROCEDURE — 94010 BREATHING CAPACITY TEST: CPT

## 2022-09-28 PROCEDURE — 90686 IIV4 VACC NO PRSV 0.5 ML IM: CPT

## 2022-09-28 PROCEDURE — G0008: CPT

## 2022-09-28 PROCEDURE — 99214 OFFICE O/P EST MOD 30 MIN: CPT | Mod: 25

## 2022-09-28 PROCEDURE — 99396 PREV VISIT EST AGE 40-64: CPT | Mod: 25

## 2022-09-28 NOTE — HISTORY OF PRESENT ILLNESS
[FreeTextEntry1] : certified for asthma, rhinitis and gerd\par visit conducted in Maltese\par arrives 30 min late\par s. pt has been overall stable from her wtc conditions.\par experienced severe weakness a few weeks ago, was found to have a hemoglobin of 4. had transfusions, feeling better now, is under care whit hematology for this, is to have endoscopies soon. \par reports occasional severe colic pain in her abdomen, severe. has had some 2 episodes of this.\par her neuro condition is stable. has pmd. \par patient is not working, she is disabled due to an accident on the job.

## 2022-09-28 NOTE — ASSESSMENT
[FreeTextEntry1] : teresita today: normal but flow volume is frankly concave. appx 60 ml/fev1 yr decrease sionce 2014.\par a. stable form her wtc but asthma is active as per pft. anemia currently udner investigation.\par p. will add breo due to flow volume findings, other meds same. will wati for hem eval. will request abdominal u/s due to colic pain and anemia. also gyn eval. rtc in some 4 mo.

## 2022-09-28 NOTE — DISCUSSION/SUMMARY
[Patient seen for WTC Monitoring ___] : Patient was seen for WTC monitoring [unfilled] [Please See Note in Chart and Documentation in Trial DB] : Please see note in chart and documentation in Trial DB. [FreeTextEntry3] : certified for asthma, rhinitis and gerd\par visit conducted in Grenadian\par arrives 30 min late\par s. pt has been overall stable from her c conditions.\par experienced severe weakness a few weeks ago, was found to have a hemoglobin of 4. had transfusions, feeling better now, is under care whit hematology for this, is to have endoscopies soon. \par reports occasional severe colic pain in her abdomen, severe. has had some 2 episodes of this.\par her neuro condition is stable. has pmd. \par patient is not working, she is disabled due to an accident on the job.\par Physical Exam\par Constitutional: alert, in no acute distress, well nourished and well developed. \par Eyes: the sclera and conjunctiva were normal, pupils were equal in size, round, and reactive to light and extraocular movements were intact. \par ENT: the ears and nose were normal in appearance. No tender at sinuses. \par Neck: the appearance of the neck was normal, the neck was supple, no neck mass was observed and the thyroid was not enlarged . there was no jugular-venous distention. \par Pulmonary: no respiratory distress, normal respiratory rhythm and effort, no accessory muscle use, lungs were clear to auscultation bilaterally and palpation of the chest revealed no abnormalities. \par Heart: the apical impulse was normal, heart rate was normal and rhythm regular, normal S1 and S2 and no gallops . systolic murmur at all foci, more artic, ii/vi. \par Vascular:. there was no peripheral edema. \par Abdomen: normal bowel sounds, soft, non-tender, no hepato-splenomegaly and no abdominal mass palpated. \par Lymphatics: The posterior cervical, anterior cervical, supraclavicular and axillary nodes were non-tender and normal size. \par a andp. documentation completed.

## 2022-09-28 NOTE — DISCUSSION/SUMMARY
[Patient seen for WTC Monitoring ___] : Patient was seen for WTC monitoring [unfilled] [Please See Note in Chart and Documentation in Trial DB] : Please see note in chart and documentation in Trial DB. [FreeTextEntry3] : certified for asthma, rhinitis and gerd\par visit conducted in Ethiopian\par arrives 30 min late\par s. pt has been overall stable from her c conditions.\par experienced severe weakness a few weeks ago, was found to have a hemoglobin of 4. had transfusions, feeling better now, is under care whit hematology for this, is to have endoscopies soon. \par reports occasional severe colic pain in her abdomen, severe. has had some 2 episodes of this.\par her neuro condition is stable. has pmd. \par patient is not working, she is disabled due to an accident on the job.\par Physical Exam\par Constitutional: alert, in no acute distress, well nourished and well developed. \par Eyes: the sclera and conjunctiva were normal, pupils were equal in size, round, and reactive to light and extraocular movements were intact. \par ENT: the ears and nose were normal in appearance. No tender at sinuses. \par Neck: the appearance of the neck was normal, the neck was supple, no neck mass was observed and the thyroid was not enlarged . there was no jugular-venous distention. \par Pulmonary: no respiratory distress, normal respiratory rhythm and effort, no accessory muscle use, lungs were clear to auscultation bilaterally and palpation of the chest revealed no abnormalities. \par Heart: the apical impulse was normal, heart rate was normal and rhythm regular, normal S1 and S2 and no gallops . systolic murmur at all foci, more artic, ii/vi. \par Vascular:. there was no peripheral edema. \par Abdomen: normal bowel sounds, soft, non-tender, no hepato-splenomegaly and no abdominal mass palpated. \par Lymphatics: The posterior cervical, anterior cervical, supraclavicular and axillary nodes were non-tender and normal size. \par a andp. documentation completed.

## 2022-09-28 NOTE — HISTORY OF PRESENT ILLNESS
[FreeTextEntry1] : certified for asthma, rhinitis and gerd\par visit conducted in Kiswahili\par arrives 30 min late\par s. pt has been overall stable from her wtc conditions.\par experienced severe weakness a few weeks ago, was found to have a hemoglobin of 4. had transfusions, feeling better now, is under care whit hematology for this, is to have endoscopies soon. \par reports occasional severe colic pain in her abdomen, severe. has had some 2 episodes of this.\par her neuro condition is stable. has pmd. \par patient is not working, she is disabled due to an accident on the job.

## 2022-09-29 ENCOUNTER — FORM ENCOUNTER (OUTPATIENT)
Age: 62
End: 2022-09-29

## 2022-09-29 LAB
ALBUMIN SERPL ELPH-MCNC: 4.3 G/DL
ALP BLD-CCNC: 71 U/L
ALT SERPL-CCNC: 20 U/L
ANION GAP SERPL CALC-SCNC: 10 MMOL/L
APPEARANCE: CLEAR
AST SERPL-CCNC: 27 U/L
BASOPHILS # BLD AUTO: 0.02 K/UL
BASOPHILS NFR BLD AUTO: 0.5 %
BILIRUB SERPL-MCNC: 0.3 MG/DL
BILIRUBIN URINE: NEGATIVE
BLOOD URINE: NEGATIVE
BUN SERPL-MCNC: 16 MG/DL
CALCIUM SERPL-MCNC: 9 MG/DL
CHLORIDE SERPL-SCNC: 111 MMOL/L
CHOLEST SERPL-MCNC: 159 MG/DL
CO2 SERPL-SCNC: 24 MMOL/L
COLOR: YELLOW
CREAT SERPL-MCNC: 0.74 MG/DL
EGFR: 93 ML/MIN/1.73M2
EOSINOPHIL # BLD AUTO: 0.07 K/UL
EOSINOPHIL NFR BLD AUTO: 1.8 %
GLUCOSE QUALITATIVE U: NEGATIVE
GLUCOSE SERPL-MCNC: 85 MG/DL
HCT VFR BLD CALC: 33.5 %
HDLC SERPL-MCNC: 65 MG/DL
HGB BLD-MCNC: 10.1 G/DL
IMM GRANULOCYTES NFR BLD AUTO: 0 %
KETONES URINE: NEGATIVE
LDLC SERPL CALC-MCNC: 77 MG/DL
LEUKOCYTE ESTERASE URINE: NEGATIVE
LYMPHOCYTES # BLD AUTO: 1.15 K/UL
LYMPHOCYTES NFR BLD AUTO: 30.1 %
MAN DIFF?: NORMAL
MCHC RBC-ENTMCNC: 30.1 GM/DL
MCHC RBC-ENTMCNC: 30.5 PG
MCV RBC AUTO: 101.2 FL
MONOCYTES # BLD AUTO: 0.44 K/UL
MONOCYTES NFR BLD AUTO: 11.5 %
NEUTROPHILS # BLD AUTO: 2.14 K/UL
NEUTROPHILS NFR BLD AUTO: 56.1 %
NITRITE URINE: NEGATIVE
NONHDLC SERPL-MCNC: 95 MG/DL
PH URINE: 5.5
PLATELET # BLD AUTO: 189 K/UL
POTASSIUM SERPL-SCNC: 5.2 MMOL/L
PROT SERPL-MCNC: 6.4 G/DL
PROTEIN URINE: NEGATIVE
RBC # BLD: 3.31 M/UL
RBC # FLD: 17.2 %
SODIUM SERPL-SCNC: 146 MMOL/L
SPECIFIC GRAVITY URINE: 1.02
TRIGL SERPL-MCNC: 87 MG/DL
UROBILINOGEN URINE: NORMAL
WBC # FLD AUTO: 3.82 K/UL

## 2022-10-11 ENCOUNTER — APPOINTMENT (OUTPATIENT)
Dept: OBGYN | Facility: CLINIC | Age: 62
End: 2022-10-11

## 2022-10-11 VITALS
WEIGHT: 135 LBS | BODY MASS INDEX: 24.84 KG/M2 | DIASTOLIC BLOOD PRESSURE: 70 MMHG | SYSTOLIC BLOOD PRESSURE: 109 MMHG | TEMPERATURE: 98.3 F | HEART RATE: 69 BPM | HEIGHT: 62 IN

## 2022-10-11 DIAGNOSIS — Z01.419 ENCOUNTER FOR GYNECOLOGICAL EXAMINATION (GENERAL) (ROUTINE) W/OUT ABNORMAL FINDINGS: ICD-10-CM

## 2022-10-11 DIAGNOSIS — Z87.09 PERSONAL HISTORY OF OTHER DISEASES OF THE RESPIRATORY SYSTEM: ICD-10-CM

## 2022-10-11 DIAGNOSIS — N95.2 POSTMENOPAUSAL ATROPHIC VAGINITIS: ICD-10-CM

## 2022-10-11 DIAGNOSIS — Z92.89 PERSONAL HISTORY OF OTHER MEDICAL TREATMENT: ICD-10-CM

## 2022-10-11 PROCEDURE — 99204 OFFICE O/P NEW MOD 45 MIN: CPT

## 2022-10-11 RX ORDER — ZOLPIDEM TARTRATE 10 MG/1
10 TABLET ORAL
Qty: 30 | Refills: 4 | Status: COMPLETED | COMMUNITY
End: 2022-10-11

## 2022-10-11 RX ORDER — ALPRAZOLAM 0.5 MG/1
0.5 TABLET ORAL
Refills: 0 | Status: COMPLETED | COMMUNITY
End: 2022-10-11

## 2022-10-11 RX ORDER — FAMOTIDINE 40 MG/1
40 TABLET, FILM COATED ORAL
Qty: 25 | Refills: 6 | Status: COMPLETED | COMMUNITY
Start: 2019-03-06 | End: 2022-10-11

## 2022-10-11 RX ORDER — CETIRIZINE HYDROCHLORIDE 10 MG/1
10 TABLET, FILM COATED ORAL
Qty: 25 | Refills: 6 | Status: COMPLETED | COMMUNITY
Start: 2019-03-06 | End: 2022-10-11

## 2022-10-11 RX ORDER — BUPROPION HYDROCHLORIDE 150 MG/1
150 TABLET, EXTENDED RELEASE ORAL DAILY
Qty: 30 | Refills: 2 | Status: COMPLETED | COMMUNITY
Start: 2019-04-18 | End: 2022-10-11

## 2022-10-11 RX ORDER — GABAPENTIN 300 MG/1
300 CAPSULE ORAL 3 TIMES DAILY
Qty: 90 | Refills: 1 | Status: COMPLETED | COMMUNITY
Start: 2019-04-18 | End: 2022-10-11

## 2022-10-11 NOTE — HISTORY OF PRESENT ILLNESS
[Normal Amount/Duration] :  normal amount and duration [Regular Cycle Intervals] : periods have been regular [Menarche Age: ____] : age at menarche was [unfilled] [Menopause Age: ____] : age at menopause was [unfilled] [FreeTextEntry1] : 47yrs [Men] : men [Vaginal] : vaginal [No] : No

## 2022-10-11 NOTE — PHYSICAL EXAM
[Chaperone Present] : A chaperone was present in the examining room during all aspects of the physical examination [Appropriately responsive] : appropriately responsive [Alert] : alert [No Acute Distress] : no acute distress [No Lymphadenopathy] : no lymphadenopathy [Regular Rate Rhythm] : regular rate rhythm [No Murmurs] : no murmurs [Clear to Auscultation B/L] : clear to auscultation bilaterally [Soft] : soft [Non-tender] : non-tender [Non-distended] : non-distended [No HSM] : No HSM [No Lesions] : no lesions [No Mass] : no mass [Oriented x3] : oriented x3 [Examination Of The Breasts] : a normal appearance [] : implants [Breast Reconstruction Right] : breast reconstruction [Breast Reconstruction Left] : breast reconstruction [No Masses] : no breast masses were palpable [Vulvar Atrophy] : vulvar atrophy [Atrophy] : atrophy [Normal] : normal [Normal Position] : in a normal position [Uterine Adnexae] : normal

## 2022-10-12 LAB — HPV HIGH+LOW RISK DNA PNL CVX: NOT DETECTED

## 2022-10-16 LAB — CYTOLOGY CVX/VAG DOC THIN PREP: ABNORMAL

## 2022-10-21 ENCOUNTER — NON-APPOINTMENT (OUTPATIENT)
Age: 62
End: 2022-10-21

## 2022-10-21 DIAGNOSIS — Z12.9 ENCOUNTER FOR SCREENING FOR MALIGNANT NEOPLASM, SITE UNSPECIFIED: ICD-10-CM

## 2022-10-26 ENCOUNTER — NON-APPOINTMENT (OUTPATIENT)
Age: 62
End: 2022-10-26

## 2022-12-07 NOTE — PATIENT PROFILE ADULT. - NS PRO REFERRAL CMGT
Per mom, patient has a red rash on arms x2 days. Pt c/o they are itchy. Radha Morales NP in triage. None

## 2023-04-18 ENCOUNTER — OUTPATIENT (OUTPATIENT)
Dept: OUTPATIENT SERVICES | Facility: HOSPITAL | Age: 63
LOS: 1 days | End: 2023-04-18
Payer: COMMERCIAL

## 2023-04-18 VITALS
RESPIRATION RATE: 16 BRPM | HEART RATE: 65 BPM | OXYGEN SATURATION: 98 % | TEMPERATURE: 97 F | DIASTOLIC BLOOD PRESSURE: 68 MMHG | SYSTOLIC BLOOD PRESSURE: 111 MMHG | WEIGHT: 142.42 LBS | HEIGHT: 62 IN

## 2023-04-18 DIAGNOSIS — Z98.82 BREAST IMPLANT STATUS: Chronic | ICD-10-CM

## 2023-04-18 DIAGNOSIS — K21.9 GASTRO-ESOPHAGEAL REFLUX DISEASE WITHOUT ESOPHAGITIS: ICD-10-CM

## 2023-04-18 DIAGNOSIS — M65.871 OTHER SYNOVITIS AND TENOSYNOVITIS, RIGHT ANKLE AND FOOT: ICD-10-CM

## 2023-04-18 DIAGNOSIS — M20.41 OTHER HAMMER TOE(S) (ACQUIRED), RIGHT FOOT: ICD-10-CM

## 2023-04-18 DIAGNOSIS — M20.5X1 OTHER DEFORMITIES OF TOE(S) (ACQUIRED), RIGHT FOOT: ICD-10-CM

## 2023-04-18 DIAGNOSIS — J45.909 UNSPECIFIED ASTHMA, UNCOMPLICATED: ICD-10-CM

## 2023-04-18 DIAGNOSIS — Z98.890 OTHER SPECIFIED POSTPROCEDURAL STATES: Chronic | ICD-10-CM

## 2023-04-18 DIAGNOSIS — M24.574 CONTRACTURE, RIGHT FOOT: ICD-10-CM

## 2023-04-18 DIAGNOSIS — Z01.818 ENCOUNTER FOR OTHER PREPROCEDURAL EXAMINATION: ICD-10-CM

## 2023-04-18 DIAGNOSIS — Z98.89 OTHER SPECIFIED POSTPROCEDURAL STATES: Chronic | ICD-10-CM

## 2023-04-18 DIAGNOSIS — Z90.3 ACQUIRED ABSENCE OF STOMACH [PART OF]: Chronic | ICD-10-CM

## 2023-04-18 DIAGNOSIS — M20.11 HALLUX VALGUS (ACQUIRED), RIGHT FOOT: ICD-10-CM

## 2023-04-18 DIAGNOSIS — Z98.84 BARIATRIC SURGERY STATUS: Chronic | ICD-10-CM

## 2023-04-18 DIAGNOSIS — Z47.2 ENCOUNTER FOR REMOVAL OF INTERNAL FIXATION DEVICE: ICD-10-CM

## 2023-04-18 PROCEDURE — G0463: CPT

## 2023-04-18 RX ORDER — FLUTICASONE FUROATE AND VILANTEROL TRIFENATATE 100; 25 UG/1; UG/1
1 POWDER RESPIRATORY (INHALATION)
Refills: 0 | DISCHARGE

## 2023-04-18 RX ORDER — ZOLPIDEM TARTRATE 10 MG/1
1 TABLET ORAL
Qty: 0 | Refills: 0 | DISCHARGE

## 2023-04-18 NOTE — H&P PST ADULT - HISTORY OF PRESENT ILLNESS
This is a 63 y/o female with PMHX of asthma, anemia who presents to PST with pre-operative diagnosis of synovitis in right foot.  s/p right foot bunionectomy.  Pt reports pain in right foot.  Otherwise patient feels well today and denies any acute symptoms.

## 2023-04-18 NOTE — H&P PST ADULT - NSICDXPASTMEDICALHX_GEN_ALL_CORE_FT
PAST MEDICAL HISTORY:  Anemia     Asthma     GERD (gastroesophageal reflux disease)     Other synovitis and tenosynovitis, right ankle and foot

## 2023-04-18 NOTE — H&P PST ADULT - NSICDXPASTSURGICALHX_GEN_ALL_CORE_FT
PAST SURGICAL HISTORY:  H/O abdominoplasty     H/O breast augmentation     H/O carpal tunnel repair     H/O hernia repair     H/O knee surgery     S/P gastric bypass

## 2023-04-18 NOTE — H&P PST ADULT - PROBLEM SELECTOR PLAN 1
Patient provided with pre-operative instructions and verbalized understanding.  Patient will be NPO on day of surgery. Patient will stop NSAIDs, aspirin, herbal supplements or vitamins 1 week prior to surgery.  Chlorhexidine wash provided with instructions.  Pending medical clearance as per surgeon.

## 2023-04-20 ENCOUNTER — TRANSCRIPTION ENCOUNTER (OUTPATIENT)
Age: 63
End: 2023-04-20

## 2023-04-21 ENCOUNTER — OUTPATIENT (OUTPATIENT)
Dept: OUTPATIENT SERVICES | Facility: HOSPITAL | Age: 63
LOS: 1 days | End: 2023-04-21
Payer: COMMERCIAL

## 2023-04-21 ENCOUNTER — TRANSCRIPTION ENCOUNTER (OUTPATIENT)
Age: 63
End: 2023-04-21

## 2023-04-21 VITALS
WEIGHT: 142.42 LBS | DIASTOLIC BLOOD PRESSURE: 61 MMHG | SYSTOLIC BLOOD PRESSURE: 99 MMHG | TEMPERATURE: 98 F | HEART RATE: 64 BPM | HEIGHT: 62 IN | OXYGEN SATURATION: 96 % | RESPIRATION RATE: 15 BRPM

## 2023-04-21 VITALS
OXYGEN SATURATION: 100 % | TEMPERATURE: 98 F | DIASTOLIC BLOOD PRESSURE: 61 MMHG | SYSTOLIC BLOOD PRESSURE: 95 MMHG | RESPIRATION RATE: 16 BRPM | HEART RATE: 67 BPM

## 2023-04-21 DIAGNOSIS — Z98.890 OTHER SPECIFIED POSTPROCEDURAL STATES: Chronic | ICD-10-CM

## 2023-04-21 DIAGNOSIS — Z47.2 ENCOUNTER FOR REMOVAL OF INTERNAL FIXATION DEVICE: ICD-10-CM

## 2023-04-21 DIAGNOSIS — M20.5X1 OTHER DEFORMITIES OF TOE(S) (ACQUIRED), RIGHT FOOT: ICD-10-CM

## 2023-04-21 DIAGNOSIS — M20.41 OTHER HAMMER TOE(S) (ACQUIRED), RIGHT FOOT: ICD-10-CM

## 2023-04-21 DIAGNOSIS — M24.574 CONTRACTURE, RIGHT FOOT: ICD-10-CM

## 2023-04-21 DIAGNOSIS — Z98.84 BARIATRIC SURGERY STATUS: Chronic | ICD-10-CM

## 2023-04-21 DIAGNOSIS — M20.11 HALLUX VALGUS (ACQUIRED), RIGHT FOOT: ICD-10-CM

## 2023-04-21 DIAGNOSIS — Z98.82 BREAST IMPLANT STATUS: Chronic | ICD-10-CM

## 2023-04-21 DIAGNOSIS — Z98.89 OTHER SPECIFIED POSTPROCEDURAL STATES: Chronic | ICD-10-CM

## 2023-04-21 PROCEDURE — 73620 X-RAY EXAM OF FOOT: CPT | Mod: 26,RT

## 2023-04-21 PROCEDURE — 88311 DECALCIFY TISSUE: CPT | Mod: 26

## 2023-04-21 PROCEDURE — 28270 RELEASE OF FOOT CONTRACTURE: CPT | Mod: T6

## 2023-04-21 PROCEDURE — 88300 SURGICAL PATH GROSS: CPT | Mod: 26,59

## 2023-04-21 PROCEDURE — 28272 RELEASE OF TOE JOINT EACH: CPT | Mod: T6

## 2023-04-21 PROCEDURE — 88300 SURGICAL PATH GROSS: CPT

## 2023-04-21 PROCEDURE — 88304 TISSUE EXAM BY PATHOLOGIST: CPT | Mod: 26

## 2023-04-21 PROCEDURE — 20680 REMOVAL OF IMPLANT DEEP: CPT

## 2023-04-21 PROCEDURE — 73620 X-RAY EXAM OF FOOT: CPT

## 2023-04-21 PROCEDURE — 88311 DECALCIFY TISSUE: CPT

## 2023-04-21 PROCEDURE — 88304 TISSUE EXAM BY PATHOLOGIST: CPT

## 2023-04-21 PROCEDURE — C1713: CPT

## 2023-04-21 PROCEDURE — 28299 COR HLX VLGS DOUBLE OSTEOT: CPT | Mod: RT

## 2023-04-21 DEVICE — SCREW CORT S-T 2X14MM
Type: IMPLANTABLE DEVICE | Site: RIGHT | Status: NON-FUNCTIONAL
Removed: 2023-04-21

## 2023-04-21 DEVICE — K-WIRE MICROAIRE (SMOOTH) DOUBLE TROCAR 1.1MM X 4"
Type: IMPLANTABLE DEVICE | Site: RIGHT | Status: NON-FUNCTIONAL
Removed: 2023-04-21

## 2023-04-21 DEVICE — SCREW CORTEX S-T 2.7X14MM
Type: IMPLANTABLE DEVICE | Site: RIGHT | Status: NON-FUNCTIONAL
Removed: 2023-04-21

## 2023-04-21 RX ORDER — SODIUM CHLORIDE 9 MG/ML
1000 INJECTION, SOLUTION INTRAVENOUS
Refills: 0 | Status: DISCONTINUED | OUTPATIENT
Start: 2023-04-21 | End: 2023-04-21

## 2023-04-21 RX ORDER — FERROUS SULFATE 325(65) MG
0 TABLET ORAL
Refills: 0 | DISCHARGE

## 2023-04-21 RX ORDER — ZOLPIDEM TARTRATE 10 MG/1
1 TABLET ORAL
Refills: 0 | DISCHARGE

## 2023-04-21 RX ORDER — IBUPROFEN 200 MG
1 TABLET ORAL
Refills: 0 | DISCHARGE

## 2023-04-21 RX ORDER — ALBUTEROL 90 UG/1
0 AEROSOL, METERED ORAL
Refills: 0 | DISCHARGE

## 2023-04-21 RX ORDER — FAMOTIDINE 10 MG/ML
1 INJECTION INTRAVENOUS
Refills: 0 | DISCHARGE

## 2023-04-21 RX ORDER — HYDROMORPHONE HYDROCHLORIDE 2 MG/ML
1 INJECTION INTRAMUSCULAR; INTRAVENOUS; SUBCUTANEOUS
Refills: 0 | Status: DISCONTINUED | OUTPATIENT
Start: 2023-04-21 | End: 2023-04-21

## 2023-04-21 RX ORDER — ASCORBIC ACID 60 MG
0 TABLET,CHEWABLE ORAL
Refills: 0 | DISCHARGE

## 2023-04-21 RX ORDER — ONDANSETRON 8 MG/1
4 TABLET, FILM COATED ORAL ONCE
Refills: 0 | Status: DISCONTINUED | OUTPATIENT
Start: 2023-04-21 | End: 2023-04-21

## 2023-04-21 RX ORDER — HYDROMORPHONE HYDROCHLORIDE 2 MG/ML
0.5 INJECTION INTRAMUSCULAR; INTRAVENOUS; SUBCUTANEOUS
Refills: 0 | Status: DISCONTINUED | OUTPATIENT
Start: 2023-04-21 | End: 2023-04-21

## 2023-04-21 RX ADMIN — SODIUM CHLORIDE 50 MILLILITER(S): 9 INJECTION, SOLUTION INTRAVENOUS at 08:01

## 2023-04-21 NOTE — BRIEF OPERATIVE NOTE - NSICDXBRIEFOPLAUNCH_GEN_ALL_CORE
Ins  Denied MRI, they need [id number], today 12/22/20 at 1:30 Dr Castillo will call Dr Blackburn  
Insurance does not want to approve without conservative treatment.  She will need to have 6 weeks of conservative treatment to have MrI
Spoke with pt on the phone to let her know that she needs 6 weeks of conservative treatment in order to get an MRI done.  
<--- Click to Launch ICDx for PreOp, PostOp and Procedure

## 2023-04-21 NOTE — PRE-ANESTHESIA EVALUATION ADULT - NSPROPOSEDPROCEDFT_GEN_ALL_CORE
Removal of painful hardware right foot Removal of painful hardware right foot/Bunionectomy/Hammertoes correction

## 2023-04-21 NOTE — BRIEF OPERATIVE NOTE - NSICDXBRIEFPOSTOP_GEN_ALL_CORE_FT
POST-OP DIAGNOSIS:  Painful orthopaedic hardware 21-Apr-2023 11:04:14  Natalio Duckworth  Bunion, right 21-Apr-2023 11:04:19  Natalio Duckworthertoe of right foot 21-Apr-2023 11:04:35  Natalio Duckworth

## 2023-04-21 NOTE — ASU DISCHARGE PLAN (ADULT/PEDIATRIC) - NS MD DC FALL RISK RISK
For information on Fall & Injury Prevention, visit: [email or web address] OR  [email or web address] OR  https://www.cdc.gov/steadi/patient.html

## 2023-04-21 NOTE — BRIEF OPERATIVE NOTE - NSICDXBRIEFPREOP_GEN_ALL_CORE_FT
PRE-OP DIAGNOSIS:  Painful orthopaedic hardware 21-Apr-2023 11:03:42  Natalio Duckworth  Bunion, right 21-Apr-2023 11:03:49  Natalio Duckworthertoe of right foot 21-Apr-2023 11:04:01  Natalio Duckworth

## 2023-04-21 NOTE — BRIEF OPERATIVE NOTE - NSICDXBRIEFPROCEDURE_GEN_ALL_CORE_FT
PROCEDURES:  Removal, hardware, deep 21-Apr-2023 11:03:20  Natalio Duckworth  Shaheed-Adrian bunionectomy 21-Apr-2023 11:03:26  Natalio Duckworth  Correction of hammertoe 21-Apr-2023 11:03:33  Natalio Duckworth

## 2023-04-21 NOTE — ASU PATIENT PROFILE, ADULT - BLOOD AVOIDANCE/RESTRICTIONS, PROFILE
Occupational Therapy  Daily Treatment     Patient Name: Morales Olivier  Age:  68 y.o., Sex:  male  Medical Record #: [id number]  Today's Date: 4/20/2021       Precautions: Fall Risk    Assessment    Pt seen for OT tx. Continues to be limited by decreased activity tolerance, balance deficits and poor safety awareness impacting ability to complete ADLs and ADL transfers independently. Thorough discussion and support regarding placement prior to d/c home to maximize independence in ADLs and ADL transfers independently. Pt receptive to education. Will continue to benefit from OT services while in house.     Plan    Continue current treatment plan.    DC Equipment Recommendations: Unable to determine at this time  Discharge Recommendations: Recommend post-acute placement for additional occupational therapy services prior to discharge home       04/20/21 1525   Balance   Sitting Balance (Static) Fair +   Sitting Balance (Dynamic) Fair   Standing Balance (Static) Fair   Standing Balance (Dynamic) Fair   Weight Shift Sitting Fair   Weight Shift Standing Fair   Bed Mobility    Supine to Sit Supervised   Sit to Supine Minimal Assist   Scooting Minimal Assist   Activities of Daily Living   Grooming Minimal Assist;Standing   Upper Body Dressing Minimal Assist   Lower Body Dressing Minimal Assist   Toileting Supervision   Functional Mobility   Sit to Stand Minimal Assist   Bed, Chair, Wheelchair Transfer Minimal Assist   Toilet Transfers Minimal Assist   Short Term Goals   Short Term Goal # 1 Pt will complete simulated medication management with no verbal cues by discharge.   Goal Outcome # 1 Goal not met   Short Term Goal # 2 Pt will complete seated shower with spv by discharge.   Goal Outcome # 2 Goal not met   Short Term Goal # 3 Pt will complete simple meal prep with spv and no verbal cues by discharge.   Goal Outcome # 3 Goal not met   Short Term Goal # 4 Pt will improve gross BUE strength to 4/5 for improved independence  with ADLs/IADLs by discharge.   Goal Outcome # 4 Goal met   Short Term Goal # 5 Pt will complete 10 mins standing ADL activity with no rest breaks by discharge.   Goal Outcome # 5 Progressing as expected   Anticipated Discharge Equipment and Recommendations   DC Equipment Recommendations Unable to determine at this time   Discharge Recommendations Recommend post-acute placement for additional occupational therapy services prior to discharge home          none

## 2023-04-21 NOTE — PRE-ANESTHESIA EVALUATION ADULT - NSANTHADDINFOFT_GEN_ALL_CORE
Discussed IVS with block by Podiatry in detail with patient via Kyrgyz  and all questions sought and answered. Pt. agrees to all plans and wishes to proceed with surgical care.    Medical evaluation/optimization and clearance noted and appreciated.

## 2023-04-21 NOTE — ASU PATIENT PROFILE, ADULT - PATIENT'S HEIGHT AND WEIGHT RECORDED IN THE VITAL SIGNS FLOWSHEET
Back Pain: Care Instructions  Your Care Instructions    Back pain has many possible causes. It is often related to problems with muscles and ligaments of the back. It may also be related to problems with the nerves, discs, or bones of the back. Moving, lifting, standing, sitting, or sleeping in an awkward way can strain the back. Sometimes you don't notice the injury until later. Arthritis is another common cause of back pain. Although it may hurt a lot, back pain usually improves on its own within several weeks. Most people recover in 12 weeks or less. Using good home treatment and being careful not to stress your back can help you feel better sooner. Follow-up care is a key part of your treatment and safety. Be sure to make and go to all appointments, and call your doctor if you are having problems. Its also a good idea to know your test results and keep a list of the medicines you take. How can you care for yourself at home? · Sit or lie in positions that are most comfortable and reduce your pain. Try one of these positions when you lie down:  ¨ Lie on your back with your knees bent and supported by large pillows. ¨ Lie on the floor with your legs on the seat of a sofa or chair. Tiffany Gal on your side with your knees and hips bent and a pillow between your legs. ¨ Lie on your stomach if it does not make pain worse. · Do not sit up in bed, and avoid soft couches and twisted positions. Bed rest can help relieve pain at first, but it delays healing. Avoid bed rest after the first day of back pain. · Change positions every 30 minutes. If you must sit for long periods of time, take breaks from sitting. Get up and walk around, or lie in a comfortable position. · Try using a heating pad on a low or medium setting for 15 to 20 minutes every 2 or 3 hours. Try a warm shower in place of one session with the heating pad. · You can also try an ice pack for 10 to 15 minutes every 2 to 3 hours.  Put a thin cloth between the ice pack and your skin. · Take pain medicines exactly as directed. ¨ If the doctor gave you a prescription medicine for pain, take it as prescribed. ¨ If you are not taking a prescription pain medicine, ask your doctor if you can take an over-the-counter medicine. · Take short walks several times a day. You can start with 5 to 10 minutes, 3 or 4 times a day, and work up to longer walks. Walk on level surfaces and avoid hills and stairs until your back is better. · Return to work and other activities as soon as you can. Continued rest without activity is usually not good for your back. · To prevent future back pain, do exercises to stretch and strengthen your back and stomach. Learn how to use good posture, safe lifting techniques, and proper body mechanics. When should you call for help? Call your doctor now or seek immediate medical care if:  · You have new or worsening numbness in your legs. · You have new or worsening weakness in your legs. (This could make it hard to stand up.)  · You lose control of your bladder or bowels. Watch closely for changes in your health, and be sure to contact your doctor if:  · Your pain gets worse. · You are not getting better after 2 weeks. Where can you learn more? Go to http://ladarius-pavel.info/. Enter D434 in the search box to learn more about \"Back Pain: Care Instructions. \"  Current as of: May 23, 2016  Content Version: 11.1  © 7038-3336 [employer]. Care instructions adapted under license by [employer] (which disclaims liability or warranty for this information). If you have questions about a medical condition or this instruction, always ask your healthcare professional. [place] [address] any warranty or liability for your use of this information.          Cervical Disc Disease: Care Instructions  Your Care Instructions    Cervical disc disease results from damage, disease, or wear and tear to the discs between the bones (vertebra) in your neck. The discs act as shock absorbers for the spine and keep the spine flexible. When a disc is damaged, it can bulge out and press against the nerve roots or spinal cord. This is sometimes called a herniated or \"slipped disc. \" This pressure can cause pain and numbness or tingling in your arms and hands. It can also cause weakness in your legs. An accident can damage a disc and cause it to break open (rupture). Aging and hard physical work can also cause damage to cervical discs. The first treatments for cervical disc disease include physical therapy, special neck exercises, heat, and pain medicine. If these fail, your doctor may inject steroids and pain medicine into your neck. Surgery is usually done only if other treatments have not worked. Follow-up care is a key part of your treatment and safety. Be sure to make and go to all appointments, and call your doctor if you are having problems. It's also a good idea to know your test results and keep a list of the medicines you take. How can you care for yourself at home? · Take pain medicines exactly as directed. ¨ If the doctor gave you a prescription medicine for pain, take it as prescribed. ¨ If you are not taking a prescription pain medicine, ask your doctor if you can take an over-the-counter medicine. · Don't spend too long in one position. Take short breaks to move around and change positions. · Wear a seat belt and shoulder harness when you are in a car. · Sleep with a pillow under your head and neck that keeps your neck straight. · Follow your doctor's instructions for gentle neck-stretching exercises. · Do not smoke. Smoking can slow healing of your discs. If you need help quitting, talk to your doctor about stop-smoking programs and medicines. These can increase your chances of quitting for good. · Avoid strenuous work or exercise until your doctor says it is okay.   When should you call for help?  Call 911 anytime you think you may need emergency care. For example, call if:  · You are unable to move an arm or a leg at all. Call your doctor now or seek immediate medical care if:  · You have new or worse symptoms in your arms, legs, chest, belly, or buttocks. Symptoms may include:  ¨ Numbness or tingling. ¨ Weakness. ¨ Pain. · You lose bladder or bowel control. Watch closely for changes in your health, and be sure to contact your doctor if:  · You are not getting better as expected. Where can you learn more? Go to http://ladariusRoboEdpavel.info/. Enter N118 in the search box to learn more about \"Cervical Disc Disease: Care Instructions. \"  Current as of: May 23, 2016  Content Version: 11.1  © 9843-5886 [employer]. Care instructions adapted under license by [employer] (which disclaims liability or warranty for this information). If you have questions about a medical condition or this instruction, always ask your healthcare professional. [place] [address] any warranty or liability for your use of this information. Pinched Nerve in the Neck: Care Instructions  Your Care Instructions  A pinched nerve in the neck happens when a vertebra or disc in the upper part of your spine is damaged. This damage can happen because of an injury. Or it can just happen with age. The changes caused by the damage may put pressure on a nearby nerve root, pinching it. This causes symptoms such as sharp pain in your neck, shoulder, arm, or back. You may also have tingling or numbness. Sometimes it makes your arm weaker. The symptoms are usually worse when you turn your head or strain your neck. For many people, the symptoms get better over time and finally go away. Early treatment usually includes medicines for pain and swelling. Sometimes physical therapy and special exercises may help. Follow-up care is a key part of your treatment and safety.  Be sure to make and go to all appointments, and call your doctor if you are having problems. It's also a good idea to know your test results and keep a list of the medicines you take. How can you care for yourself at home? · Be safe with medicines. Read and follow all instructions on the label. ¨ If the doctor gave you a prescription medicine for pain, take it as prescribed. ¨ If you are not taking a prescription pain medicine, ask your doctor if you can take an over-the-counter medicine. · Try using a heating pad on a low or medium setting for 15 to 20 minutes every 2 or 3 hours. Try a warm shower in place of one session with the heating pad. You can also buy single-use heat wraps that last up to 8 hours. · You can also try an ice pack for 10 to 15 minutes every 2 to 3 hours. There isn't strong evidence that either heat or ice will help. But you can try them to see if they help you. · Don't spend too long in one position. Take short breaks to move around and change positions. · Wear a seat belt and shoulder harness when you are in a car. · Sleep with a pillow under your head and neck that keeps your neck straight. · If you were given a neck brace (cervical collar) to limit neck motion, wear it as instructed for as many days as your doctor tells you to. Do not wear it longer than you were told to. Wearing a brace for too long can lead to neck stiffness and can weaken the neck muscles. · Follow your doctor's instructions for gentle neck-stretching exercises. · Do not smoke. Smoking can slow healing of your discs. If you need help quitting, talk to your doctor about stop-smoking programs and medicines. These can increase your chances of quitting for good. · Avoid strenuous work or exercise until your doctor says it is okay. When should you call for help? Call 911 anytime you think you may need emergency care. For example, call if:  · You are unable to move an arm or a leg at all.   Call your doctor now or seek immediate medical care if:  · You have new or worse symptoms in your arms, legs, chest, belly, or buttocks. Symptoms may include:  ¨ Numbness or tingling. ¨ Weakness. ¨ Pain. · You lose bladder or bowel control. Watch closely for changes in your health, and be sure to contact your doctor if:  · You are not getting better as expected. Where can you learn more? Go to http://ladarius-pavel.info/. Enter X426 in the search box to learn more about \"Pinched Nerve in the Neck: Care Instructions. \"  Current as of: May 23, 2016  Content Version: 11.1  © 7430-2871 [employer]. Care instructions adapted under license by [employer] (which disclaims liability or warranty for this information). If you have questions about a medical condition or this instruction, always ask your healthcare professional. [address] any warranty or liability for your use of this information. yes

## 2023-04-28 LAB — SURGICAL PATHOLOGY STUDY: SIGNIFICANT CHANGE UP

## 2023-05-31 ENCOUNTER — APPOINTMENT (OUTPATIENT)
Dept: OTHER | Facility: CLINIC | Age: 63
End: 2023-05-31
Payer: COMMERCIAL

## 2023-05-31 VITALS
HEART RATE: 64 BPM | RESPIRATION RATE: 16 BRPM | WEIGHT: 144 LBS | OXYGEN SATURATION: 98 % | DIASTOLIC BLOOD PRESSURE: 62 MMHG | BODY MASS INDEX: 26.5 KG/M2 | HEIGHT: 62 IN | SYSTOLIC BLOOD PRESSURE: 100 MMHG | TEMPERATURE: 97.6 F

## 2023-05-31 DIAGNOSIS — F33.9 MAJOR DEPRESSIVE DISORDER, RECURRENT, UNSPECIFIED: ICD-10-CM

## 2023-05-31 PROBLEM — M65.871 OTHER SYNOVITIS AND TENOSYNOVITIS, RIGHT ANKLE AND FOOT: Chronic | Status: ACTIVE | Noted: 2023-04-18

## 2023-05-31 PROBLEM — K21.9 GASTRO-ESOPHAGEAL REFLUX DISEASE WITHOUT ESOPHAGITIS: Chronic | Status: ACTIVE | Noted: 2023-04-18

## 2023-05-31 PROBLEM — J45.909 UNSPECIFIED ASTHMA, UNCOMPLICATED: Chronic | Status: ACTIVE | Noted: 2023-04-18

## 2023-05-31 PROCEDURE — 99214 OFFICE O/P EST MOD 30 MIN: CPT

## 2023-05-31 NOTE — HISTORY OF PRESENT ILLNESS
[FreeTextEntry1] : certified for asthma, rhinitis and gerd\par visit conducted in Greek\par s. pt has been overall stable from her [facility] conditions. her breathing has been better with breo. she reports watery nasal secretions, abundant, worse during these months. she has no nasal spray. \par her blood issues apparently are stable, she recently had a foot surgery for which she did blood work and was cleared. no etiology for anemia has been clarified.\par continues with occasional severe colic pain in her abdomen, severe. has had some 2 episodes of this.\par she reports insomnia. [facility] program. \par has a history of intracraneal bleeding. no need for follow up. has pmd. \par patient is not working, she is disabled due to an accident on the job.

## 2023-05-31 NOTE — ASSESSMENT
[FreeTextEntry1] : a. persistent nasal symptoms and expiratory wheezing. persistent abdominal cholic pain, was found to have some abnormalities in the common biliary duct. pscyh conditions are active.\par p. will refer to gi, will refer to psych. given ambien today for insominia but will defer to psych to continue this. continue breo. will see her in some 4 mo.

## 2023-07-01 NOTE — ED PROVIDER NOTE - EYES, MLM
denies pain/discomfort (Rating = 0) Extra ocular muscles intact, pupils 3mm and reactive to light, no photophobia, no pain with eye movement. no hyphema. OU: 20/20

## 2023-07-12 NOTE — DISCHARGE NOTE ADULT - NSTOBACCOREFERRAL_GEN_A_CS
Impression: Combined forms of age-related cataract, bilateral: H25.813. Plan: Discussed cataract diagnosis with the patient. Discussed and reviewed treatment options for cataracts. Surgical treatment is required for cataracts. Risks and benefits of surgical treatment were discussed and understood. Patient elects surgical treatment. Recommend surgery OU,OD      first. PLAN STD LENS, DISTANCE AIM, NO UPGRADES, REVIEWED SHER, PLAN LRI. PATIENT UNDERSTANDS THE NEED FOR GLASSES POST-OP FOR BEST OVER ALL VISION. Discussed limitations to vision post op due to  [facility] [address] . Sesar Awkward If first eye doing well, ok to proceed with second eye surgery . Sesar Awkward   INJECTABLE, DEXTENZA 1ST CHOICE, TRIMOXI 2ND Patient declined information

## 2023-07-24 ENCOUNTER — LABORATORY RESULT (OUTPATIENT)
Age: 63
End: 2023-07-24

## 2023-07-24 ENCOUNTER — APPOINTMENT (OUTPATIENT)
Dept: GASTROENTEROLOGY | Facility: CLINIC | Age: 63
End: 2023-07-24
Payer: COMMERCIAL

## 2023-07-24 VITALS
SYSTOLIC BLOOD PRESSURE: 108 MMHG | WEIGHT: 136 LBS | BODY MASS INDEX: 25.03 KG/M2 | TEMPERATURE: 96.9 F | OXYGEN SATURATION: 99 % | HEART RATE: 66 BPM | DIASTOLIC BLOOD PRESSURE: 68 MMHG | HEIGHT: 62 IN

## 2023-07-24 DIAGNOSIS — R10.84 GENERALIZED ABDOMINAL PAIN: ICD-10-CM

## 2023-07-24 DIAGNOSIS — K83.8 OTHER SPECIFIED DISEASES OF BILIARY TRACT: ICD-10-CM

## 2023-07-24 DIAGNOSIS — R19.8 OTHER SPECIFIED SYMPTOMS AND SIGNS INVOLVING THE DIGESTIVE SYSTEM AND ABDOMEN: ICD-10-CM

## 2023-07-24 DIAGNOSIS — R10.13 EPIGASTRIC PAIN: ICD-10-CM

## 2023-07-24 DIAGNOSIS — R93.89 ABNORMAL FINDINGS ON DIAGNOSTIC IMAGING OF OTHER SPECIFIED BODY STRUCTURES: ICD-10-CM

## 2023-07-24 DIAGNOSIS — R11.0 NAUSEA: ICD-10-CM

## 2023-07-24 PROCEDURE — 99204 OFFICE O/P NEW MOD 45 MIN: CPT

## 2023-07-24 RX ORDER — FLUOCINOLONE ACETONIDE 0.1 MG/ML
0.01 SOLUTION TOPICAL
Qty: 360 | Refills: 0 | Status: ACTIVE | COMMUNITY
Start: 2023-07-13

## 2023-07-24 RX ORDER — HYDROXYZINE HYDROCHLORIDE 25 MG/1
25 TABLET ORAL
Qty: 30 | Refills: 0 | Status: ACTIVE | COMMUNITY
Start: 2023-07-13

## 2023-07-24 RX ORDER — BACLOFEN 10 MG/1
10 TABLET ORAL
Qty: 30 | Refills: 0 | Status: ACTIVE | COMMUNITY
Start: 2023-07-13

## 2023-07-24 RX ORDER — IBUPROFEN 800 MG/1
800 TABLET, FILM COATED ORAL
Qty: 90 | Refills: 0 | Status: ACTIVE | COMMUNITY
Start: 2023-07-13

## 2023-07-24 NOTE — ASSESSMENT
[FreeTextEntry1] : Abdominal Pain: The patient complains of abdominal pain. The patient is to avoid nonsteroidal anti-inflammatory drugs and aspirin.  I recommend a low FOD-MAP diet.  I recommend a trial of Dicyclomine 10 mg tablet PO 3 times a day PRN for the abdominal pain.\par Dyspepsia: The patient complains of dyspeptic symptoms.  The patient was advised to abide by an anti-gas (low FOD-MAP) diet.  The patient was given a pamphlet for anti-gas (low FOD-MAP).  The patient and I reviewed the anti-gas (low FOD-MAP) diet at length. The patient is to start on a trial of Simethicone one tablet 4 times a day p.r.n. abdominal pain and gas.\par GERD: The patient was advised to avoid late-night meals and dietary indiscretions.  The patient was advised to avoid fried and fatty foods.  The patient was advised to abide by an anti-GERD diet. The patient was given a pamphlet for anti-GERD.  The patient and I reviewed the anti-GERD diet at length. I recommend a trial of Pantoprazole 40 mg once a day x 3 months for the symptoms.\par Nausea: The patient complains of nausea. If the symptoms of nausea persists, the patient may require a trial of Zofran 4 mg twice a day. \par Alternating Diarrhea/Constipation: The patient complains of alternating diarrhea/constipation.  I recommend a low residue diet. The patient is to avoid fiber supplementation. The patient is to consider starting a trial of a probiotic such as Align once a day.   The symptoms are worse after meals.  The patient has a history of cholecystectomy.  I recommend a trial of cholestyramine one packet once a day for possible bile induced diarrhea. If the symptoms persist, the patient may require a colonoscopy to assess for colitis versus other causes.  The patient was told of the risks and benefits of the procedure.  The patient was told of the risks of perforation, emergency surgery, bleeding, infections and missed lesions.  The patient agreed and will follow-up to reassess the symptoms.\par Prior Endoscopic Procedures: The patient had a prior upper endoscopy and colonoscopy performed by another gastroenterologist.  I will try to obtain the prior upper endoscopy and colonoscopy reports.  The patient is to sign a record release to obtain those records.\par Abnormal Imaging Study: The abdominal ultrasound performed on October 20, 2022 revealed redemonstrated stable cyst of the right lobe of the liver, normal gallbladder and dilated extrahepatic common bile duct, no hydronephrosis or renal calculi identified.  \par Dilated biliary tree: The abdominal ultrasound performed on October 20, 2022 revealed redemonstrated stable cyst of the right lobe of the liver, normal gallbladder and dilated extrahepatic common bile duct, no hydronephrosis or renal calculi identified.  The blood work performed on September 28 2022 revealed an elevated sodium/chloride level of 146/111 mmol/L, normal liver enzymes with a total bilirubin of 0.3 mg/dL, a normal alkaline phosphatase/AST/ALT of 71/27/20 U/L, respectively, anemia with a hemoglobin/hematocrit level of 10.1/33.5, respectively, a normal total cholesterol/triglyceride level of 159/87 mg/dL, respectively.  I recommend an MRI of the abdomen and MRCP with and without IV contrast to assess the dilated extrahepatic common bile duct.\par Imaging Study: I recommend an imaging study to assess the symptoms. I recommend an MRI of the abdomen and MRCP with and without IV contrast to assess the liver and biliary tree and to assess the pancreas for pancreatic  lesions.\par Blood Work: I recommend blood work to assess the patient's symptoms. I recommend a CBC, SMA 24, amylase, lipase, ESR, CEA, CA 19-9, AFP level, celiac sprue panel, IgA, profile for hepatitis A, B, C. ,iron, TIBC, ferritin level and lipid profile.  I also recommend obtaining the recent blood work performed by the patient's PMD.\par Follow-up: The patient is to follow-up in the office in 4 weeks to reassess the symptoms. The patient was told to call the office if any further problems.

## 2023-07-24 NOTE — HISTORY OF PRESENT ILLNESS
[FreeTextEntry1] : The patient is a 63-year-old  female with no significant past medical history with past medical history significant for anemia, depression, prior obesity, s/p gastric bypass surgery, and asthma who was referred to my office by Dr. Jose Mahoney and Dr. Kamron Brito of [facility] for abnormal imaging study. The patient also admits to having abdominal pain, dyspepsia, gastroesophageal reflux disease, constipation, change in bowel habits and change in caliber of stool. I was asked to render an opinion for consultation for the above complaints.   The patient states that she is feeling tired.  The patient has a history of dilated biliary tree noted on prior imaging study. The patient denies any prior exposure to hepatitis A, B or C.  The patient denies any large doses of nonsteroidal anti-inflammatory drugs or acetaminophen.   The patient denies sharing needles, razors, nail clippers, nail files, scissors, et cetera.  The patient denies any EtOH abuse, cocaine use or intravenous drug use.   The patient denies any prior blood transfusions, sexual indiscretions, tattoos or piercing.  The patient admits to having prior surgery.  The history of surgery is significant for a prior left knee arthroscopy, carpal tunnel release, liposuction, right foot surgery, left shoulder surgery, breast implants, gastric bypass surgery.  The patient admits to a prior blood transfusion in the past.  The patient denies any family history of GI or liver problems.  The patient admits to a family history of GI and liver problems. The patient's mother had a history of pancreatic cancer and maternal aunts had gastric cancer x 2.   The patient complains of occasional abdominal pain.  The patient describes the abdominal pain as a crampy, intermittent, lower abdominal discomfort that is nonradiating in nature.  The abdominal pain is unrelated to meals or passing gas or having bowel movements.  The abdominal pain is described as moderate in nature.  The abdominal pain occurs at night and in the morning.  The abdominal pain can occur at any time.   The abdominal pain has awakened the patient from sleep.  The abdominal pain is slightly relieved with certain medication such as buscapine.  The abdominal pain is associated with abdominal gas and bloating.  The patient complains of nausea but denies any vomiting.  The patient complains of gastroesophageal reflux disease but denies any dysphagia. The gastroesophageal reflux disease is worse after meals and late at night and in the early morning.  The gastroesophageal reflux disease is slightly improved with proton pump inhibitors, H2 blockers and antacids.  The patient denies any atypical chest pain, shortness of breath or palpitations.  The patient denies any diaphoresis. The patient complains of constipation but denies any diarrhea.  The patient has 1 bowel movement every 2 to 3 days. The patient complains of a change in bowel habits.  The patient complains of a change in caliber of stool.   The patient denies having mucus discharge with the bowel movements.  The patient denies any bright red blood per rectum, melena or hematemesis.  The patient denies any rectal pain or rectal pruritus. The patient denies any weight loss or anorexia.  She denies any fevers or chills.  The patient denies any jaundice or pruritus.  The patient complains of chronic lower back pain. The abdominal ultrasound performed on 2022 revealed redemonstrated stable cyst of the right lobe of the liver, normal gallbladder and dilated extrahepatic common bile duct, no hydronephrosis or renal calculi identified.  The Blood work performed on 2023 revealed mild anemia with a hemoglobin/hematocrit level of 10.9/33.2, respectively, a normal platelet count of 202,000, a normal nonfasting blood glucose of 87 mg/dL, and elevated chloride level of 115 mmol/L, normal liver enzymes with a total bilirubin of 0.5 mg/dL, a normal alkaline phosphatase/AST/ALT 80/27/24 U/L, respectively, a normal hemoglobin A1c of 4.8% a nonreactive HIV antigen/antibody, a normal total cholesterol/triglyceride level of 159/86 mg/dL, respectively, and elevated rheumatoid factor IgA of 84 IU/mL, a normal TSH of 1.61u IU/mL, a normal uric acid level of 2.9 mg/dL The blood work performed on 2022 revealed an elevated sodium/chloride level of 146/111 mmol/L, normal liver enzymes with a total bilirubin of 0.3 mg/dL, a normal alkaline phosphatase/AST/ALT of 71/27/20 U/L, respectively, anemia with a hemoglobin/hematocrit level of 10.1/33.5, respectively, a normal total cholesterol/triglyceride level of 159/87 mg/dL, respectively.  The urinalysis performed on 2022 was unremarkable.  The patient admits to having a prior upper endoscopy and colonoscopy performed by another gastroenterologist, Dr. Evelina Sewell.  According to the patient, the upper endoscopic findings revealed reflux esophagitis and gastritis, s/p bypass surgery without ulcers with minimal gastric pouch and normal a ferret and a ferret limbs.  The colonoscopic findings revealed a normal study.   The patient's last menstrual period was . The patient is a .  The patient's first menstrual period was at age 17. The patient admits to a family history of GI and liver problems. The patient's mother had a history of pancreatic cancer and maternal aunts had gastric cancer x 2.   \par (-) smoking, (-) ETOH, (-) IVDA\par  [de-identified] : Blood work performed on [de-identified] : The abdominal ultrasound performed on October 20, 2022 revealed redemonstrated stable cyst of the right lobe of the liver, normal gallbladder and dilated extrahepatic common bile duct, no hydronephrosis or renal calculi identified.

## 2023-07-24 NOTE — REVIEW OF SYSTEMS
[Feeling Tired] : feeling tired [Constipation] : constipation [Heartburn] : heartburn [Bloating (gassiness)] : bloating [Arthralgias (joint pain)] : arthralgias [Itching] : itching [Dizziness] : dizziness [Sleep Disturbances] : sleep disturbances [Anxiety] : anxiety [Depression] : depression [Easy Bruising] : a tendency for easy bruising [Negative] : Heme/Lymph [FreeTextEntry3] : blurred vision [FreeTextEntry9] : myalgias [de-identified] : headaches

## 2023-07-25 ENCOUNTER — NON-APPOINTMENT (OUTPATIENT)
Age: 63
End: 2023-07-25

## 2023-07-25 LAB
AFP-TM SERPL-MCNC: 2.9 NG/ML
ALBUMIN SERPL ELPH-MCNC: 4.4 G/DL
ALP BLD-CCNC: 80 U/L
ALT SERPL-CCNC: 16 U/L
AMYLASE/CREAT SERPL: 62 U/L
ANION GAP SERPL CALC-SCNC: 12 MMOL/L
AST SERPL-CCNC: 24 U/L
BILIRUB SERPL-MCNC: 0.2 MG/DL
BUN SERPL-MCNC: 19 MG/DL
CALCIUM SERPL-MCNC: 9.4 MG/DL
CANCER AG19-9 SERPL-ACNC: 9 U/ML
CEA SERPL-MCNC: 2.2 NG/ML
CHLORIDE SERPL-SCNC: 109 MMOL/L
CO2 SERPL-SCNC: 22 MMOL/L
CREAT SERPL-MCNC: 0.76 MG/DL
EGFR: 89 ML/MIN/1.73M2
ERYTHROCYTE [SEDIMENTATION RATE] IN BLOOD BY WESTERGREN METHOD: 7 MM/HR
GGT SERPL-CCNC: 10 U/L
GLIADIN IGA SER QL: 5.3 UNITS
GLIADIN IGG SER QL: <5 UNITS
GLIADIN PEPTIDE IGA SER-ACNC: NEGATIVE
GLIADIN PEPTIDE IGG SER-ACNC: NEGATIVE
GLUCOSE SERPL-MCNC: 98 MG/DL
HBV CORE IGG+IGM SER QL: NONREACTIVE
HBV CORE IGM SER QL: NONREACTIVE
HBV E AB SER QL: NONREACTIVE
HBV E AG SER QL: NONREACTIVE
HBV SURFACE AB SER QL: NONREACTIVE
HBV SURFACE AG SER QL: NONREACTIVE
HCV AB SER QL: NONREACTIVE
HCV S/CO RATIO: 0.06 S/CO
HEPATITIS A IGG ANTIBODY: REACTIVE
IGA SER QL IEP: 287 MG/DL
LPL SERPL-CCNC: 48 U/L
POTASSIUM SERPL-SCNC: 4.6 MMOL/L
PROT SERPL-MCNC: 6.8 G/DL
SODIUM SERPL-SCNC: 143 MMOL/L
TTG IGA SER IA-ACNC: <1.2 U/ML
TTG IGA SER-ACNC: NEGATIVE
TTG IGG SER IA-ACNC: <1.2 U/ML
TTG IGG SER IA-ACNC: NEGATIVE

## 2023-07-30 NOTE — ED PROVIDER NOTE - TIMING
Pt reports have black tarry stool that started tonight with coffee ground emesis. 10/10 abdominal pain.   gradual onset

## 2023-08-22 ENCOUNTER — OUTPATIENT (OUTPATIENT)
Dept: OUTPATIENT SERVICES | Facility: HOSPITAL | Age: 63
LOS: 1 days | End: 2023-08-22
Payer: COMMERCIAL

## 2023-08-22 ENCOUNTER — APPOINTMENT (OUTPATIENT)
Dept: MRI IMAGING | Facility: HOSPITAL | Age: 63
End: 2023-08-22
Payer: COMMERCIAL

## 2023-08-22 DIAGNOSIS — R10.84 GENERALIZED ABDOMINAL PAIN: ICD-10-CM

## 2023-08-22 DIAGNOSIS — Z98.82 BREAST IMPLANT STATUS: Chronic | ICD-10-CM

## 2023-08-22 DIAGNOSIS — Z98.890 OTHER SPECIFIED POSTPROCEDURAL STATES: Chronic | ICD-10-CM

## 2023-08-22 DIAGNOSIS — R93.89 ABNORMAL FINDINGS ON DIAGNOSTIC IMAGING OF OTHER SPECIFIED BODY STRUCTURES: ICD-10-CM

## 2023-08-22 DIAGNOSIS — Z98.89 OTHER SPECIFIED POSTPROCEDURAL STATES: Chronic | ICD-10-CM

## 2023-08-22 DIAGNOSIS — Z98.84 BARIATRIC SURGERY STATUS: Chronic | ICD-10-CM

## 2023-08-22 DIAGNOSIS — K83.8 OTHER SPECIFIED DISEASES OF BILIARY TRACT: ICD-10-CM

## 2023-08-22 PROCEDURE — 74183 MRI ABD W/O CNTR FLWD CNTR: CPT | Mod: 26

## 2023-08-22 PROCEDURE — A9585: CPT

## 2023-08-22 PROCEDURE — 74183 MRI ABD W/O CNTR FLWD CNTR: CPT

## 2023-08-25 ENCOUNTER — APPOINTMENT (OUTPATIENT)
Dept: GASTROENTEROLOGY | Facility: CLINIC | Age: 63
End: 2023-08-25

## 2023-08-25 VITALS
WEIGHT: 140 LBS | OXYGEN SATURATION: 98 % | HEART RATE: 65 BPM | HEIGHT: 62 IN | DIASTOLIC BLOOD PRESSURE: 58 MMHG | BODY MASS INDEX: 25.76 KG/M2 | TEMPERATURE: 97.3 F | SYSTOLIC BLOOD PRESSURE: 94 MMHG

## 2023-08-29 ENCOUNTER — NON-APPOINTMENT (OUTPATIENT)
Age: 63
End: 2023-08-29

## 2023-08-30 ENCOUNTER — NON-APPOINTMENT (OUTPATIENT)
Age: 63
End: 2023-08-30

## 2023-09-08 NOTE — PATIENT PROFILE ADULT. - NSTOBACCONEVERSMOKERY/N_GEN_A
FYI - Status Update    Who is Calling: family member, mom    Update: mother called to update PCP and to find out the last date of her TDAP.     Does caller want a call/response back: No    No

## 2023-09-12 NOTE — H&P PST ADULT - NSICDXFAMILYHX_GEN_ALL_CORE_FT
FAMILY HISTORY:  Mother  Still living? Unknown  Family history of malignant neoplasm of pancreas, Age at diagnosis: Age Unknown     Yes

## 2023-09-20 ENCOUNTER — APPOINTMENT (OUTPATIENT)
Dept: OTHER | Facility: CLINIC | Age: 63
End: 2023-09-20
Payer: COMMERCIAL

## 2023-09-20 VITALS
TEMPERATURE: 97.6 F | HEART RATE: 63 BPM | SYSTOLIC BLOOD PRESSURE: 107 MMHG | BODY MASS INDEX: 25.21 KG/M2 | RESPIRATION RATE: 16 BRPM | HEIGHT: 62 IN | DIASTOLIC BLOOD PRESSURE: 72 MMHG | OXYGEN SATURATION: 97 % | WEIGHT: 137 LBS

## 2023-09-20 DIAGNOSIS — Z04.9 ENCOUNTER FOR EXAMINATION AND OBSERVATION FOR UNSPECIFIED REASON: ICD-10-CM

## 2023-09-20 PROCEDURE — 90686 IIV4 VACC NO PRSV 0.5 ML IM: CPT

## 2023-09-20 PROCEDURE — 94010 BREATHING CAPACITY TEST: CPT

## 2023-09-20 PROCEDURE — 99396 PREV VISIT EST AGE 40-64: CPT | Mod: 25

## 2023-09-20 PROCEDURE — 99214 OFFICE O/P EST MOD 30 MIN: CPT | Mod: 25

## 2023-09-20 PROCEDURE — G0008: CPT

## 2023-09-21 LAB
ALBUMIN SERPL ELPH-MCNC: 4.4 G/DL
ALP BLD-CCNC: 78 U/L
ALT SERPL-CCNC: 17 U/L
ANION GAP SERPL CALC-SCNC: 11 MMOL/L
APPEARANCE: CLEAR
AST SERPL-CCNC: 23 U/L
BILIRUB SERPL-MCNC: 0.3 MG/DL
BILIRUBIN URINE: NEGATIVE
BLOOD URINE: NEGATIVE
BUN SERPL-MCNC: 12 MG/DL
CALCIUM SERPL-MCNC: 9.1 MG/DL
CHLORIDE SERPL-SCNC: 106 MMOL/L
CHOLEST SERPL-MCNC: 186 MG/DL
CO2 SERPL-SCNC: 23 MMOL/L
COLOR: YELLOW
CREAT SERPL-MCNC: 0.67 MG/DL
EGFR: 99 ML/MIN/1.73M2
GLUCOSE QUALITATIVE U: NEGATIVE MG/DL
GLUCOSE SERPL-MCNC: 101 MG/DL
HCT VFR BLD CALC: 35.9 %
HDLC SERPL-MCNC: 69 MG/DL
HGB BLD-MCNC: 11 G/DL
KETONES URINE: NEGATIVE MG/DL
LDLC SERPL CALC-MCNC: 96 MG/DL
LEUKOCYTE ESTERASE URINE: NEGATIVE
MCHC RBC-ENTMCNC: 30.6 GM/DL
MCHC RBC-ENTMCNC: 31.4 PG
MCV RBC AUTO: 102.6 FL
NITRITE URINE: NEGATIVE
NONHDLC SERPL-MCNC: 117 MG/DL
PH URINE: 5.5
PLATELET # BLD AUTO: 218 K/UL
POTASSIUM SERPL-SCNC: 4.3 MMOL/L
PROT SERPL-MCNC: 6.9 G/DL
PROTEIN URINE: NEGATIVE MG/DL
RBC # BLD: 3.5 M/UL
RBC # FLD: 12.2 %
SODIUM SERPL-SCNC: 140 MMOL/L
SPECIFIC GRAVITY URINE: 1.02
TRIGL SERPL-MCNC: 122 MG/DL
UROBILINOGEN URINE: 0.2 MG/DL
WBC # FLD AUTO: 5.44 K/UL

## 2023-10-04 NOTE — ED ADULT TRIAGE NOTE - MODE OF ARRIVAL
Bedside HD tx complete. 3 L removed over 3.5 hours, tolerated well. Blood returned, 15g needles removed x2, gauze and tape applied, pressure held to each site for 10 minutes, hemostasis achieved. Report given to June PYLE. VSS, no complaints voiced   Walk in

## 2023-11-01 ENCOUNTER — NON-APPOINTMENT (OUTPATIENT)
Age: 63
End: 2023-11-01

## 2023-11-17 NOTE — ED ADULT TRIAGE NOTE - CCCP TRG CHIEF CMPLNT
Last office visit: 6/12/23  Last refill: 7/19/23  Last labs: 10/5/22  Future Appointments   Date Time Provider Millicent Victoria   12/12/2023  1:00 PM Art Aguilar MD [facility]
headache

## 2024-01-17 ENCOUNTER — APPOINTMENT (OUTPATIENT)
Dept: OTHER | Facility: CLINIC | Age: 64
End: 2024-01-17
Payer: COMMERCIAL

## 2024-01-17 VITALS
HEIGHT: 62 IN | OXYGEN SATURATION: 98 % | SYSTOLIC BLOOD PRESSURE: 110 MMHG | WEIGHT: 140 LBS | TEMPERATURE: 98.1 F | BODY MASS INDEX: 25.76 KG/M2 | HEART RATE: 63 BPM | DIASTOLIC BLOOD PRESSURE: 73 MMHG

## 2024-01-17 DIAGNOSIS — J31.0 CHRONIC RHINITIS: ICD-10-CM

## 2024-01-17 DIAGNOSIS — J45.909 UNSPECIFIED ASTHMA, UNCOMPLICATED: ICD-10-CM

## 2024-01-17 DIAGNOSIS — K21.9 GASTRO-ESOPHAGEAL REFLUX DISEASE W/OUT ESOPHAGITIS: ICD-10-CM

## 2024-01-17 PROCEDURE — 99214 OFFICE O/P EST MOD 30 MIN: CPT

## 2024-01-17 RX ORDER — ZOLPIDEM TARTRATE 5 MG/1
5 TABLET ORAL
Qty: 30 | Refills: 4 | Status: ACTIVE | COMMUNITY
Start: 2023-05-31 | End: 1900-01-01

## 2024-01-17 NOTE — PHYSICAL EXAM
[General Appearance - Alert] : alert [General Appearance - In No Acute Distress] : in no acute distress [General Appearance - Well Nourished] : well nourished [General Appearance - Well Developed] : well developed [Sclera] : the sclera and conjunctiva were normal [PERRL With Normal Accommodation] : pupils were equal in size, round, and reactive to light [Extraocular Movements] : extraocular movements were intact [Outer Ear] : the ears and nose were normal in appearance [Neck Appearance] : the appearance of the neck was normal [Neck Cervical Mass (___cm)] : no neck mass was observed [Jugular Venous Distention Increased] : there was no jugular-venous distention [Thyroid Diffuse Enlargement] : the thyroid was not enlarged [Respiration, Rhythm And Depth] : normal respiratory rhythm and effort [Exaggerated Use Of Accessory Muscles For Inspiration] : no accessory muscle use [Auscultation Breath Sounds / Voice Sounds] : lungs were clear to auscultation bilaterally [Chest Palpation] : palpation of the chest revealed no abnormalities [Apical Impulse] : the apical impulse was normal [Heart Rate And Rhythm] : heart rate was normal and rhythm regular [Heart Sounds] : normal S1 and S2 [Heart Sounds Gallop] : no gallops [Murmurs] : no murmurs [FreeTextEntry1] : no murmur noted today.  [Edema] : there was no peripheral edema [Bowel Sounds] : normal bowel sounds [Abdomen Soft] : soft [Abdomen Tenderness] : non-tender [] : no hepato-splenomegaly [Abdomen Mass (___ Cm)] : no abdominal mass palpated [Cervical Lymph Nodes Enlarged Posterior Bilaterally] : posterior cervical [Cervical Lymph Nodes Enlarged Anterior Bilaterally] : anterior cervical [Supraclavicular Lymph Nodes Enlarged Bilaterally] : supraclavicular [Axillary Lymph Nodes Enlarged Bilaterally] : axillary

## 2024-01-17 NOTE — HISTORY OF PRESENT ILLNESS
[FreeTextEntry1] : certified for asthma, rhinitis and gerd visit conducted in Faro[place] arrives 20 min late s. pt has been overall stable from her [facility] conditions. her breathing has been better with breo. she continues with watery nasal secretions, abundant. ipratropium helped better. gerd is stable.   her blood issues apparently are stable. zolpidem is helping for insomnia as well. is under pscyhologist under the [facility] program.  has been depressed especially due to her mouth issues and loss of teeth.  has a history of intracraneal bleeding. no need for follow up. has pmd. has dental issues.  patient is not working, she is disabled due to an accident on the job.

## 2024-01-17 NOTE — ASSESSMENT
[FreeTextEntry1] : a. overall stable from her wtc conditions.  p. meds renewed, unchanged. they are helping. wll see her in some 4-5 mo.

## 2024-01-24 ENCOUNTER — NON-APPOINTMENT (OUTPATIENT)
Age: 64
End: 2024-01-24

## 2024-02-14 ENCOUNTER — RX RENEWAL (OUTPATIENT)
Age: 64
End: 2024-02-14

## 2024-06-05 ENCOUNTER — APPOINTMENT (OUTPATIENT)
Dept: OTHER | Facility: CLINIC | Age: 64
End: 2024-06-05

## 2024-06-06 RX ORDER — OLOPATADINE HCL 1 MG/ML
0.1 SOLUTION/ DROPS OPHTHALMIC TWICE DAILY
Qty: 1 | Refills: 6 | Status: ACTIVE | COMMUNITY
Start: 2018-02-07 | End: 1900-01-01

## 2024-06-06 RX ORDER — IPRATROPIUM BROMIDE 21 UG/1
0.03 SPRAY NASAL 3 TIMES DAILY
Qty: 1 | Refills: 6 | Status: ACTIVE | COMMUNITY
Start: 2023-09-20 | End: 1900-01-01

## 2024-06-06 RX ORDER — ALBUTEROL SULFATE 90 UG/1
108 (90 BASE) AEROSOL, METERED RESPIRATORY (INHALATION)
Qty: 1 | Refills: 6 | Status: ACTIVE | COMMUNITY
Start: 2018-02-07 | End: 1900-01-01

## 2024-06-06 RX ORDER — FLUTICASONE PROPIONATE 50 UG/1
50 SPRAY, METERED NASAL
Qty: 1 | Refills: 5 | Status: ACTIVE | COMMUNITY
Start: 2023-05-31 | End: 1900-01-01

## 2024-06-06 RX ORDER — FLUTICASONE FUROATE AND VILANTEROL TRIFENATATE 100; 25 UG/1; UG/1
100-25 POWDER RESPIRATORY (INHALATION)
Qty: 1 | Refills: 6 | Status: ACTIVE | COMMUNITY
Start: 2022-09-28 | End: 1900-01-01

## 2024-06-06 RX ORDER — LORATADINE 10 MG/1
10 TABLET ORAL
Qty: 20 | Refills: 6 | Status: ACTIVE | COMMUNITY
Start: 2023-05-31 | End: 1900-01-01

## 2024-09-22 NOTE — ASU PREOP CHECKLIST - PATIENT'S PERSONAL PROPERTY GIVEN TO
Patient: Brian Ayon    Procedure Information       Anesthesia Start Date/Time: 09/22/24 1229    Procedure: Appendectomy Laparoscopy    Location: [facility] OR 07 / [facility]    Surgeons: Prashant Alicia MD PhD            Relevant Problems   Liver   (+) Biliary sludge       Clinical information reviewed:   Tobacco  Allergies  Meds  Problems  Med Hx  Surg Hx   Fam Hx  Soc   Hx        NPO Detail:  No data recorded     Physical Exam    Airway  Mallampati: III  TM distance: >3 FB  Neck ROM: full     Cardiovascular   Rhythm: regular  Rate: normal     Dental   (+) upper dentures     Pulmonary   Breath sounds clear to auscultation  (+) decreased breath sounds     Abdominal   (+) obese  Abdomen: soft  Bowel sounds: normal           Anesthesia Plan    History of general anesthesia?: no  History of complications of general anesthesia?: unknown/emergency    ASA 3     general     The patient is a current smoker.  Patient was previously instructed to abstain from smoking on day of procedure.  Patient did not smoke on day of procedure.  Education provided regarding risk of obstructive sleep apnea.  intravenous induction   Postoperative administration of opioids is intended.  Anesthetic plan and risks discussed with patient and spouse.  Use of blood products discussed with patient and spouse who consented to blood products.    Plan discussed with CRNA.       on unit

## 2024-12-11 ENCOUNTER — LABORATORY RESULT (OUTPATIENT)
Age: 64
End: 2024-12-11

## 2024-12-11 ENCOUNTER — APPOINTMENT (OUTPATIENT)
Dept: OTHER | Facility: CLINIC | Age: 64
End: 2024-12-11
Payer: COMMERCIAL

## 2024-12-11 VITALS
BODY MASS INDEX: 25.76 KG/M2 | TEMPERATURE: 98.3 F | WEIGHT: 140 LBS | HEART RATE: 62 BPM | OXYGEN SATURATION: 96 % | RESPIRATION RATE: 15 BRPM | SYSTOLIC BLOOD PRESSURE: 110 MMHG | HEIGHT: 62 IN | DIASTOLIC BLOOD PRESSURE: 65 MMHG

## 2024-12-11 DIAGNOSIS — J45.909 UNSPECIFIED ASTHMA, UNCOMPLICATED: ICD-10-CM

## 2024-12-11 DIAGNOSIS — J31.0 CHRONIC RHINITIS: ICD-10-CM

## 2024-12-11 DIAGNOSIS — K21.9 GASTRO-ESOPHAGEAL REFLUX DISEASE W/OUT ESOPHAGITIS: ICD-10-CM

## 2024-12-11 DIAGNOSIS — Z04.9 ENCOUNTER FOR EXAMINATION AND OBSERVATION FOR UNSPECIFIED REASON: ICD-10-CM

## 2024-12-11 PROCEDURE — 94010 BREATHING CAPACITY TEST: CPT

## 2024-12-11 PROCEDURE — 99214 OFFICE O/P EST MOD 30 MIN: CPT | Mod: 25

## 2024-12-11 PROCEDURE — G0008: CPT

## 2024-12-11 PROCEDURE — 99396 PREV VISIT EST AGE 40-64: CPT | Mod: 25

## 2024-12-11 PROCEDURE — 90686 IIV4 VACC NO PRSV 0.5 ML IM: CPT

## 2024-12-11 RX ORDER — CLONAZEPAM 1 MG/1
1 TABLET ORAL
Qty: 30 | Refills: 5 | Status: ACTIVE | COMMUNITY
Start: 2024-12-11 | End: 1900-01-01

## 2024-12-12 LAB
ALBUMIN SERPL ELPH-MCNC: 4.3 G/DL
ALP BLD-CCNC: 78 U/L
ALT SERPL-CCNC: 18 U/L
ANION GAP SERPL CALC-SCNC: 21 MMOL/L
AST SERPL-CCNC: 28 U/L
BASOPHILS # BLD AUTO: 0.02 K/UL
BASOPHILS NFR BLD AUTO: 0.5 %
BILIRUB SERPL-MCNC: 0.3 MG/DL
BUN SERPL-MCNC: 19 MG/DL
CALCIUM SERPL-MCNC: 9.5 MG/DL
CHLORIDE SERPL-SCNC: 106 MMOL/L
CHOLEST SERPL-MCNC: 163 MG/DL
CO2 SERPL-SCNC: 16 MMOL/L
CREAT SERPL-MCNC: 0.72 MG/DL
EGFR: 94 ML/MIN/1.73M2
EOSINOPHIL # BLD AUTO: 0.14 K/UL
EOSINOPHIL NFR BLD AUTO: 3.2 %
GLUCOSE SERPL-MCNC: 78 MG/DL
HCT VFR BLD CALC: 36.8 %
HDLC SERPL-MCNC: 69 MG/DL
HGB BLD-MCNC: 11.2 G/DL
IMM GRANULOCYTES NFR BLD AUTO: 0.2 %
LDLC SERPL CALC-MCNC: 79 MG/DL
LYMPHOCYTES # BLD AUTO: 1.21 K/UL
LYMPHOCYTES NFR BLD AUTO: 27.9 %
MAN DIFF?: NORMAL
MCHC RBC-ENTMCNC: 30.4 G/DL
MCHC RBC-ENTMCNC: 32 PG
MCV RBC AUTO: 105.1 FL
MONOCYTES # BLD AUTO: 0.6 K/UL
MONOCYTES NFR BLD AUTO: 13.9 %
NEUTROPHILS # BLD AUTO: 2.35 K/UL
NEUTROPHILS NFR BLD AUTO: 54.3 %
NONHDLC SERPL-MCNC: 95 MG/DL
PLATELET # BLD AUTO: 178 K/UL
POTASSIUM SERPL-SCNC: 4.3 MMOL/L
PROT SERPL-MCNC: 6.8 G/DL
RBC # BLD: 3.5 M/UL
RBC # FLD: 14.1 %
SODIUM SERPL-SCNC: 142 MMOL/L
TRIGL SERPL-MCNC: 87 MG/DL
WBC # FLD AUTO: 4.33 K/UL

## 2025-01-14 ENCOUNTER — EMERGENCY (EMERGENCY)
Facility: HOSPITAL | Age: 65
LOS: 1 days | Discharge: HOME | End: 2025-01-14
Attending: EMERGENCY MEDICINE
Payer: COMMERCIAL

## 2025-01-14 VITALS
HEART RATE: 57 BPM | RESPIRATION RATE: 18 BRPM | TEMPERATURE: 98 F | SYSTOLIC BLOOD PRESSURE: 104 MMHG | OXYGEN SATURATION: 100 % | DIASTOLIC BLOOD PRESSURE: 59 MMHG

## 2025-01-14 VITALS
TEMPERATURE: 98 F | HEART RATE: 64 BPM | HEIGHT: 62 IN | OXYGEN SATURATION: 99 % | RESPIRATION RATE: 14 BRPM | SYSTOLIC BLOOD PRESSURE: 120 MMHG | WEIGHT: 141.76 LBS | DIASTOLIC BLOOD PRESSURE: 65 MMHG

## 2025-01-14 DIAGNOSIS — Z98.890 OTHER SPECIFIED POSTPROCEDURAL STATES: Chronic | ICD-10-CM

## 2025-01-14 DIAGNOSIS — Z98.89 OTHER SPECIFIED POSTPROCEDURAL STATES: Chronic | ICD-10-CM

## 2025-01-14 DIAGNOSIS — Z98.84 BARIATRIC SURGERY STATUS: Chronic | ICD-10-CM

## 2025-01-14 DIAGNOSIS — Z98.82 BREAST IMPLANT STATUS: Chronic | ICD-10-CM

## 2025-01-14 LAB
APTT BLD: 31.5 SEC — SIGNIFICANT CHANGE UP (ref 24.5–35.6)
BASOPHILS # BLD AUTO: 0.02 K/UL — SIGNIFICANT CHANGE UP (ref 0–0.2)
BASOPHILS NFR BLD AUTO: 0.3 % — SIGNIFICANT CHANGE UP (ref 0–2)
CK SERPL-CCNC: 85 U/L — SIGNIFICANT CHANGE UP (ref 21–215)
EOSINOPHIL # BLD AUTO: 0.27 K/UL — SIGNIFICANT CHANGE UP (ref 0–0.5)
EOSINOPHIL NFR BLD AUTO: 4.3 % — SIGNIFICANT CHANGE UP (ref 0–6)
HCT VFR BLD CALC: 31.1 % — LOW (ref 34.5–45)
HGB BLD-MCNC: 10.1 G/DL — LOW (ref 11.5–15.5)
IMM GRANULOCYTES NFR BLD AUTO: 0.3 % — SIGNIFICANT CHANGE UP (ref 0–0.9)
INR BLD: 0.97 RATIO — SIGNIFICANT CHANGE UP (ref 0.85–1.16)
LYMPHOCYTES # BLD AUTO: 1.72 K/UL — SIGNIFICANT CHANGE UP (ref 1–3.3)
LYMPHOCYTES # BLD AUTO: 27.7 % — SIGNIFICANT CHANGE UP (ref 13–44)
MAGNESIUM SERPL-MCNC: 2.4 MG/DL — SIGNIFICANT CHANGE UP (ref 1.6–2.6)
MCHC RBC-ENTMCNC: 32.5 G/DL — SIGNIFICANT CHANGE UP (ref 32–36)
MCHC RBC-ENTMCNC: 32.6 PG — SIGNIFICANT CHANGE UP (ref 27–34)
MCV RBC AUTO: 100.3 FL — HIGH (ref 80–100)
MONOCYTES # BLD AUTO: 0.72 K/UL — SIGNIFICANT CHANGE UP (ref 0–0.9)
MONOCYTES NFR BLD AUTO: 11.6 % — SIGNIFICANT CHANGE UP (ref 2–14)
NEUTROPHILS # BLD AUTO: 3.47 K/UL — SIGNIFICANT CHANGE UP (ref 1.8–7.4)
NEUTROPHILS NFR BLD AUTO: 55.8 % — SIGNIFICANT CHANGE UP (ref 43–77)
NRBC # BLD: 0 /100 WBCS — SIGNIFICANT CHANGE UP (ref 0–0)
NT-PROBNP SERPL-SCNC: 321 PG/ML — HIGH (ref 0–125)
PLATELET # BLD AUTO: 216 K/UL — SIGNIFICANT CHANGE UP (ref 150–400)
PROTHROM AB SERPL-ACNC: 11.4 SEC — SIGNIFICANT CHANGE UP (ref 9.9–13.4)
RBC # BLD: 3.1 M/UL — LOW (ref 3.8–5.2)
RBC # FLD: 12.9 % — SIGNIFICANT CHANGE UP (ref 10.3–14.5)
TROPONIN I, HIGH SENSITIVITY RESULT: 8.2 NG/L — SIGNIFICANT CHANGE UP
WBC # BLD: 6.22 K/UL — SIGNIFICANT CHANGE UP (ref 3.8–10.5)
WBC # FLD AUTO: 6.22 K/UL — SIGNIFICANT CHANGE UP (ref 3.8–10.5)

## 2025-01-14 PROCEDURE — 83735 ASSAY OF MAGNESIUM: CPT

## 2025-01-14 PROCEDURE — 36415 COLL VENOUS BLD VENIPUNCTURE: CPT

## 2025-01-14 PROCEDURE — 70498 CT ANGIOGRAPHY NECK: CPT | Mod: MC

## 2025-01-14 PROCEDURE — 83880 ASSAY OF NATRIURETIC PEPTIDE: CPT

## 2025-01-14 PROCEDURE — 70496 CT ANGIOGRAPHY HEAD: CPT | Mod: 26

## 2025-01-14 PROCEDURE — 85610 PROTHROMBIN TIME: CPT

## 2025-01-14 PROCEDURE — 70498 CT ANGIOGRAPHY NECK: CPT | Mod: 26

## 2025-01-14 PROCEDURE — 85730 THROMBOPLASTIN TIME PARTIAL: CPT

## 2025-01-14 PROCEDURE — 70496 CT ANGIOGRAPHY HEAD: CPT | Mod: MC

## 2025-01-14 PROCEDURE — 99285 EMERGENCY DEPT VISIT HI MDM: CPT

## 2025-01-14 PROCEDURE — 93005 ELECTROCARDIOGRAM TRACING: CPT

## 2025-01-14 PROCEDURE — 85025 COMPLETE CBC W/AUTO DIFF WBC: CPT

## 2025-01-14 PROCEDURE — 82550 ASSAY OF CK (CPK): CPT

## 2025-01-14 PROCEDURE — 84484 ASSAY OF TROPONIN QUANT: CPT

## 2025-01-14 PROCEDURE — T1013: CPT

## 2025-01-14 PROCEDURE — 99285 EMERGENCY DEPT VISIT HI MDM: CPT | Mod: 25

## 2025-01-14 PROCEDURE — 80053 COMPREHEN METABOLIC PANEL: CPT

## 2025-01-14 RX ORDER — MECLIZINE HYDROCHLORIDE 25 MG/1
25 TABLET ORAL ONCE
Refills: 0 | Status: COMPLETED | OUTPATIENT
Start: 2025-01-14 | End: 2025-01-14

## 2025-01-14 RX ORDER — MECLIZINE HYDROCHLORIDE 25 MG/1
1 TABLET ORAL
Qty: 10 | Refills: 0
Start: 2025-01-14

## 2025-01-14 RX ADMIN — MECLIZINE HYDROCHLORIDE 25 MILLIGRAM(S): 25 TABLET ORAL at 16:16

## 2025-01-14 NOTE — ED PROVIDER NOTE - NSFOLLOWUPCLINICS_GEN_ALL_ED_FT
[facility]  Neurology  [address] [place] [address]  Phone: [phone]  Fax: [phone]  Follow Up Time: 1-3 Days

## 2025-01-14 NOTE — ED PROVIDER NOTE - PROGRESS NOTE DETAILS
ADDENDUM by Sandie Forte M.D.: I received sign-off from Dr. CANDACE Norris. 64-year-old female with no past medical history presents with dizziness since yesterday morning, labs unremarkable, I was to reassess following CTA. CT unremarkable. On my assessment, patient confirms history, states improved following meclizine, denies all other symptoms including pain, vomiting, fever. States wants to go home. On exam, NAD, well-appearing, laying comfortably in bed comfortably, RRR, lungs CTAB, AAO, CN's 3-12 intact, motor 5/5 in all extremities, finger-to-nose normal, normal independent gait. Patient is well appearing, NAD, afebrile, hemodynamically stable. Any available tests and studies were discussed with patient. Discussed with patient and offered admission for further clarification of her dizziness. She is alert and oriented and displays appropriate decision-making capacity and does not want to stay. States she has felt this way in the past and was prescribed meclizine in the past for this. Discharged with Rx meclizine, instructions in further symptomatic care, return precautions, and need for neurology f/u.

## 2025-01-14 NOTE — ED ADULT NURSE NOTE - PAIN: PRESENCE, MLM
Prescription approved per [facility] Refill Protocol.    Arminda Wolf RN -- [facility] Workforce        complains of pain/discomfort

## 2025-01-14 NOTE — ED PROVIDER NOTE - CLINICAL SUMMARY MEDICAL DECISION MAKING FREE TEXT BOX
patient room spinning sensation since yesterday morning.  Well-appearing on exam neurological exam unremarkable.  NIH stroke scale 0.  Passed dysphagia well.  Will do labs CT angio meclizine reassess.

## 2025-01-14 NOTE — ED PROVIDER NOTE - PHYSICAL EXAMINATION
Heart regular lungs clear abdomen soft nontender intact finger-to-nose ambulatory steady gait no pronator drift no nystagmus speech clear NIH stroke scale 0.  Passed dysphagia eval.

## 2025-01-14 NOTE — ED PROVIDER NOTE - OBJECTIVE STATEMENT
64-year-old female denies any medical problems presents with room spinning sensation since waking up yesterday morning.  Symptoms got worse today.  Denies any chest pain any weakness or numbness any trouble with speech or vision.  She is never had this happen before.

## 2025-01-14 NOTE — ED PROVIDER NOTE - PATIENT PORTAL LINK FT
You can access the FollowMyHealth Patient Portal offered by [facility] by registering at the following website: [email or web address]. By joining [employer]’s FollowMyHealth portal, you will also be able to view your health information using other applications (apps) compatible with our system. You can access the FollowMyHealth Patient Portal offered by [facility] by registering at the following website: [email or web address]. By joining [employer]’s FollowMyHealth portal, you will also be able to view your health information using other applications (apps) compatible with our system.

## 2025-01-14 NOTE — ED PROVIDER NOTE - NSFOLLOWUPINSTRUCTIONS_ED_ALL_ED_FT
Henrique un seguimiento con goetz médico de atención primaria y un neurólogo en 1 o 2 días.  Por favor manténgase uriel hidratado.  Utilice meclizina según sea necesario para los mareos.  Regrese al departamento de emergencias si empeora los mareos, el dolor en el pecho, la dificultad para respirar, los vómitos, la fiebre o cualquier otro síntoma.    Please follow up with your primary care doctor and a neurologist in 1-2 days.  Please keep well hydrated.  Please use meclizine as needed for dizziness.  Please return to the emergency department if you have worsening dizziness, chest pain, shortness of breath, vomiting, fever, or any other symptoms.

## 2025-01-14 NOTE — ED ADULT NURSE NOTE - NSFALLUNIVINTERV_ED_ALL_ED
I verified with pt's Ins, they will only cover Chiro for anything related to the back, not anything else, such as shoulders, can cervical be used?  If not then maybe a ortho instead.    Bed/Stretcher in lowest position, wheels locked, appropriate side rails in place/Call bell, personal items and telephone in reach/Instruct patient to call for assistance before getting out of bed/chair/stretcher/Non-slip footwear applied when patient is off stretcher/[place] to call system/Physically safe environment - no spills, clutter or unnecessary equipment/Purposeful proactive rounding/Room/bathroom lighting operational, light cord in reach

## 2025-01-28 NOTE — ED ADULT NURSE NOTE - EENT WDL
Chloe Medina  [address] [address]      2025      : 1989    Dear Ms. Medina:    We have been trying to reach you without success.  Please call my office at [phone] and ask for the open access line to schedule a procedure in Gastroenterology department.    Thank you for your timely response.    Sincerely,         Milla Baltazar MD         Eyes with no visual disturbances.  Ears clean and dry and no hearing difficulties. Nose with pink mucosa and no drainage.  Mouth mucous membranes moist and pink.  No tenderness or swelling to throat or neck.

## 2025-02-18 NOTE — ED ADULT TRIAGE NOTE - BMI (KG/M2)
Preop Diagnosis: Wounds   Planned Procedure: LLE CO2 Angiogram  Surgeon: Dr. Louis     Vital Signs Last 24 Hrs  T(C): 36.7 (2025 05:09), Max: 36.8 (2025 13:16)  T(F): 98.1 (2025 05:09), Max: 98.3 (2025 21:13)  HR: 103 (2025 05:09) (100 - 106)  BP: 112/60 (2025 05:09) (94/57 - 117/66)  BP(mean): --  RR: 17 (2025 05:09) (17 - 18)  SpO2: 94% (2025 05:09) (91% - 98%)    Parameters below as of 2025 05:09  Patient On (Oxygen Delivery Method): room air      Daily     Daily Weight in k.8 (2025 05:09)    Pertinent Labs:                        8.6    17.21 )-----------( 420      ( 2025 07:16 )             24.5     02-18    141  |  95[L]  |  76[H]  ----------------------------<  192[H]  3.4[L]   |  23  |  3.49[H]    Ca    9.6      2025 07:17  Mg     2.1     -18        Assessment:  65y.o. Female with PAD, CLTI affecting b/l LE, CHF, chronic b/l LE wound R worsen then the L, with R foot blistering and ulceration was transfer to [facility] for CO2 angiogram. Currently patient Cr is elevated, not medically optimized for the procedure. Patient is currently followed by medicine and cardiology.     Plan:  - OR 25 for LLE CO2 angiogram with    - NPO after midnight, except medications   - IVF while NPO  - Consent signed in chart  - Pre-op Labs at 4:00 am on /: CBC, BMP, Mag, Phos, PT/PTT/INR, T&S     Surgery  p 24.7

## 2025-03-12 ENCOUNTER — APPOINTMENT (OUTPATIENT)
Dept: OTHER | Facility: CLINIC | Age: 65
End: 2025-03-12
Payer: COMMERCIAL

## 2025-03-12 DIAGNOSIS — J31.0 CHRONIC RHINITIS: ICD-10-CM

## 2025-03-12 DIAGNOSIS — F33.9 MAJOR DEPRESSIVE DISORDER, RECURRENT, UNSPECIFIED: ICD-10-CM

## 2025-03-12 DIAGNOSIS — J45.909 UNSPECIFIED ASTHMA, UNCOMPLICATED: ICD-10-CM

## 2025-03-12 DIAGNOSIS — K21.9 GASTRO-ESOPHAGEAL REFLUX DISEASE W/OUT ESOPHAGITIS: ICD-10-CM

## 2025-03-12 PROCEDURE — 99212 OFFICE O/P EST SF 10 MIN: CPT | Mod: 93

## 2025-03-12 RX ORDER — ZOLPIDEM TARTRATE 10 MG/1
10 TABLET ORAL
Qty: 30 | Refills: 4 | Status: ACTIVE | COMMUNITY
Start: 2025-03-12 | End: 1900-01-01

## 2025-03-26 ENCOUNTER — NON-APPOINTMENT (OUTPATIENT)
Age: 65
End: 2025-03-26

## 2025-07-16 ENCOUNTER — NON-APPOINTMENT (OUTPATIENT)
Age: 65
End: 2025-07-16

## 2025-07-16 ENCOUNTER — APPOINTMENT (OUTPATIENT)
Dept: OTHER | Facility: CLINIC | Age: 65
End: 2025-07-16

## (undated) DEVICE — DRILL BIT SYNTHES ORTHO QC 1.5X85MM

## (undated) DEVICE — GOWN LG W TOWEL

## (undated) DEVICE — DRSG STOCKINETTE IMPERVIOUS XL

## (undated) DEVICE — SOLIDIFIER CANN EXPRESS 3K

## (undated) DEVICE — SUT POLYSORB 3-0 18" C-13 UNDYED

## (undated) DEVICE — GLV 7 PROTEXIS (WHITE)

## (undated) DEVICE — WARMING BLANKET UPPER ADULT

## (undated) DEVICE — SAW BLADE CONMED LINVATEC RECIPROCATING CROSS CUT SMALL 5.5 X 9.5MM

## (undated) DEVICE — SYR LUER LOK 10CC

## (undated) DEVICE — POSITIONER FOAM HEAD CRADLE (PINK)

## (undated) DEVICE — SAW BLADE MICROAIRE SAGITTAL 9.4MMX25.4MMX0.6MM

## (undated) DEVICE — GLV 6.5 PROTEXIS (WHITE)

## (undated) DEVICE — NDL HYPO REGULAR BEVEL 25G X 1.5" (BLUE)

## (undated) DEVICE — DRSG ADAPTIC 3 X 8"

## (undated) DEVICE — DRAPE C ARM C-ARMOUR

## (undated) DEVICE — STRYKER INTERPULSE HANDPIECE W IRR SUCTION TUBE

## (undated) DEVICE — SUT POLYSORB 4-0 30" C-13 UNDYED

## (undated) DEVICE — GLV 6 PROTEXIS (WHITE)

## (undated) DEVICE — GOWN TRIMAX XXL

## (undated) DEVICE — SUT MONOSOF 5-0 18" C-13

## (undated) DEVICE — DRILL BIT SYNTHES ORTHO QC 2.7X100MM

## (undated) DEVICE — DRAPE INSTRUMENT POUCH 6.75" X 11"

## (undated) DEVICE — DRSG WEBRIL 4"

## (undated) DEVICE — DRILL BIT SYNTHES ORTHO QC 2X100MM

## (undated) DEVICE — DRAPE TOWEL BLUE 17" X 24"

## (undated) DEVICE — ELCTR BOVIE TIP BLADE INSULATED 2.75" EDGE

## (undated) DEVICE — GLV 7.5 PROTEXIS (WHITE)

## (undated) DEVICE — SUT NYLON 2-0 18" FS

## (undated) DEVICE — TOURNIQUET CUFF 18" DUAL PORT SINGLE BLADDER W PLC  (BLACK)

## (undated) DEVICE — SUT ETHIBOND 1 30" CT-1

## (undated) DEVICE — DRAPE C ARM UNIVERSAL

## (undated) DEVICE — ELCTR GROUNDING PAD ADULT COVIDIEN

## (undated) DEVICE — SUT SURGIPRO II 5-0 24" CV-11 DA

## (undated) DEVICE — VENODYNE/SCD SLEEVE CALF MEDIUM

## (undated) DEVICE — SOL IRR POUR NS 0.9% 500ML

## (undated) DEVICE — PREP CHLORAPREP HI-LITE ORANGE 26ML

## (undated) DEVICE — SHOE  POSTOP SOFTIE DARCO M-LG

## (undated) DEVICE — GLV 7.5 PROTEXIS (BLUE)

## (undated) DEVICE — SUT POLYSORB 2-0 30" V-20 UNDYED

## (undated) DEVICE — BLADE SCALPEL SAFETYLOCK #15

## (undated) DEVICE — SUT MONOSOF 4-0 18" C-13

## (undated) DEVICE — PACK LOWER EXTREMITY

## (undated) DEVICE — ARTHREX MULTIFIRE SCORPION NEEDLE

## (undated) DEVICE — MARKING PEN WRITESITE PLUS

## (undated) DEVICE — CANISTER SUCTION LID GUARD 3000CC